# Patient Record
Sex: MALE | Race: WHITE | NOT HISPANIC OR LATINO | Employment: OTHER
[De-identification: names, ages, dates, MRNs, and addresses within clinical notes are randomized per-mention and may not be internally consistent; named-entity substitution may affect disease eponyms.]

---

## 2018-01-29 ENCOUNTER — RECORDS - HEALTHEAST (OUTPATIENT)
Dept: ADMINISTRATIVE | Facility: OTHER | Age: 58
End: 2018-01-29

## 2018-02-02 ENCOUNTER — RECORDS - HEALTHEAST (OUTPATIENT)
Dept: ADMINISTRATIVE | Facility: OTHER | Age: 58
End: 2018-02-02

## 2018-02-05 ENCOUNTER — AMBULATORY - HEALTHEAST (OUTPATIENT)
Dept: VASCULAR SURGERY | Facility: CLINIC | Age: 58
End: 2018-02-05

## 2018-02-05 DIAGNOSIS — L97.519 VARICOSE VEINS OF RIGHT LOWER EXTREMITY WITH ULCER OTHER PART OF FOOT (H): ICD-10-CM

## 2018-02-05 DIAGNOSIS — I83.015 VARICOSE VEINS OF RIGHT LOWER EXTREMITY WITH ULCER OTHER PART OF FOOT (H): ICD-10-CM

## 2018-02-13 ENCOUNTER — AMBULATORY - HEALTHEAST (OUTPATIENT)
Dept: VASCULAR SURGERY | Facility: CLINIC | Age: 58
End: 2018-02-13

## 2018-02-13 RX ORDER — IBUPROFEN 600 MG/1
600 TABLET, FILM COATED ORAL EVERY 6 HOURS PRN
Status: SHIPPED | COMMUNITY
Start: 2018-02-13

## 2018-02-13 RX ORDER — AMLODIPINE BESYLATE 5 MG/1
5 TABLET ORAL DAILY
Status: SHIPPED | COMMUNITY
Start: 2018-02-13 | End: 2023-12-05

## 2018-02-13 ASSESSMENT — MIFFLIN-ST. JEOR: SCORE: 1658.1

## 2018-02-14 ENCOUNTER — OFFICE VISIT - HEALTHEAST (OUTPATIENT)
Dept: VASCULAR SURGERY | Facility: CLINIC | Age: 58
End: 2018-02-14

## 2018-02-14 DIAGNOSIS — L97.319: ICD-10-CM

## 2018-02-14 DIAGNOSIS — L97.312 SKIN ULCER OF RIGHT ANKLE WITH FAT LAYER EXPOSED (H): ICD-10-CM

## 2018-02-14 DIAGNOSIS — I83.893 SYMPTOMATIC VARICOSE VEINS OF BOTH LOWER EXTREMITIES: ICD-10-CM

## 2018-02-14 RX ORDER — SILVER SULFADIAZINE 1 %
1 CREAM (GRAM) TOPICAL DAILY
Status: SHIPPED | COMMUNITY
Start: 2018-01-29 | End: 2023-12-05

## 2018-11-29 ENCOUNTER — COMMUNICATION - HEALTHEAST (OUTPATIENT)
Dept: VASCULAR SURGERY | Facility: CLINIC | Age: 58
End: 2018-11-29

## 2018-11-29 DIAGNOSIS — I83.893 VARICOSE VEINS OF LOWER EXTREMITIES WITH COMPLICATIONS, BILATERAL: ICD-10-CM

## 2021-06-01 VITALS — BODY MASS INDEX: 24.49 KG/M2 | HEIGHT: 72 IN | WEIGHT: 180.8 LBS

## 2021-06-01 VITALS — WEIGHT: 183.6 LBS

## 2021-06-16 NOTE — PROGRESS NOTES
[facility] Vein Consult      Assessment:     1. varicose veins, bilateral   2. venous stasis ulcer, right   3. Spider veins bilaterally    Plan:     1. Treatment options of conservative therapy of stockings use, exercise, weight loss,  elevating legs when possible.    2. Script for compression stockings 20-30 mm hg  3. Ultrasound to evaluate legs for incompetency of both deep and superficial system .   4. Surgical treatment   Debrided wounds today.dressing changes of silvadene guaze every day.    5. Follow up: with ultrasound and visit .   6. Call for any questions concerns or issues    Subjective:      Gurwinder Martin is a 57 y.o. male  who was referred by Michael Shen MD  for evaluation of varicose veins. Symptoms include pain, aching, fatigue, burning, edema, dermatitis, episodes of superficial thrombophlebitis and open weping ulcer for weeks.. Patient has history of leg selling, pain and vein issues that have progressed. Pain and symptoms have affected daily living and work activities needing medications. Here for evaluation today. no stocking or compression devic use    Allergies:Penicillins    Past Medical History:   Diagnosis Date     Hypertension      Varicose veins of right lower extremity        History reviewed. No pertinent surgical history.    Current Outpatient Prescriptions   Medication Sig Note     amLODIPine (NORVASC) 5 MG tablet Take 5 mg by mouth daily.      ibuprofen (ADVIL,MOTRIN) 600 MG tablet Take 600 mg by mouth every 6 (six) hours as needed for pain.      SSD 1 % cream Apply 1 application topically daily. 2/14/2018: Received from: External Pharmacy       History reviewed. No pertinent family history.     reports that he has been smoking.  He does not have any smokeless tobacco history on file.      Review of Systems  Pertinent items are noted in HPI.  A 12 point comprehensive review of systems was negative except as noted.      Objective:     Vitals:    02/14/18 1045   BP: 130/90    Patient Site: Left Arm   Patient Position: Sitting   Pulse: 100   Temp: 98.6  F (37  C)   TempSrc: Oral   Weight: 183 lb 9.6 oz (83.3 kg)     Body mass index is 24.9 kg/(m^2).    EXAM:  GENERAL: This is a well-developed 57 y.o. male who appears his stated age  HEAD: normocephalic  HEENT: Pupils equal and reactive bilaterally  MOUTH: mucus membranes intact. Normal dentation  CARDIAC: RRR without murmur  CHEST/LUNG:  Clear to auscultation bilaterally  ABDOMEN: Soft, nontender, nondistended, no masses noted   NEUROLOGIC: Focally intact, nonfocal, alert and oriented x 3  INTEGUMENT: No open lesions or ulcers  VASCULAR: Pulses intact, symmetrical upper and lower extremities. There areskin changes consistent with chronic venous insufficiency. Varicose veins present in bilateral greater saphenous distribution. Spider veins present bilateral. Ulcer 0.5 x 2 cm size with dead tissue in wound    Procedure:    Debridement:   After consent with the patient the area was prepped with lidocaine gel.  We then debrided this sharply after letting that sit for about 10-15 minutes.  We debrided the dead necrotic tissue there is material into and through the subcutaneous tissue.  All this nonviable tissue was removed.  Left with a good granulation base are healthy looking bleeding tissue.  The size of this wound was half a centimeter by 2 cm.  1 cm  area was debrided this was then dressed with a Silvadene and a foam overlay dressing.    William Gaviria MD  [facility] Surgery Dept.    1  .

## 2021-06-16 NOTE — PROGRESS NOTES
Pt was referred by his primary due to a wound on his right foot interior lateral, has had wound for 1 month.  Bulging veins bilateral, pt does not wear compression socks.  No history of varicose vein treatment or an ultrasound.

## 2021-06-26 NOTE — PROGRESS NOTES
Progress Notes by Tatyana Bo RN at 2/14/2018 10:40 AM     Author: Tatyana Bo RN Service: -- Author Type: Registered Nurse    Filed: 2/14/2018  3:51 PM Encounter Date: 2/14/2018 Status: Signed    : Tatyana Bo RN (Registered Nurse)           Left leg 2/4        Right leg 2/14        Right medial ankle wound 2/14

## 2023-06-10 ENCOUNTER — HOSPITAL ENCOUNTER (EMERGENCY)
Facility: CLINIC | Age: 63
Discharge: HOME | End: 2023-06-11
Attending: EMERGENCY MEDICINE | Admitting: EMERGENCY MEDICINE
Payer: COMMERCIAL

## 2023-06-10 ENCOUNTER — APPOINTMENT (OUTPATIENT)
Dept: CT IMAGING | Facility: CLINIC | Age: 63
End: 2023-06-10
Attending: EMERGENCY MEDICINE
Payer: COMMERCIAL

## 2023-06-10 DIAGNOSIS — K61.0 PERIANAL ABSCESS: ICD-10-CM

## 2023-06-10 LAB
ALBUMIN SERPL-MCNC: 4.4 G/DL (ref 3.5–5)
ALP SERPL-CCNC: 82 U/L (ref 45–120)
ALT SERPL W P-5'-P-CCNC: <9 U/L (ref 0–45)
ANION GAP SERPL CALCULATED.3IONS-SCNC: 10 MMOL/L (ref 5–18)
AST SERPL W P-5'-P-CCNC: 14 U/L (ref 0–40)
BASOPHILS # BLD AUTO: 0.1 10E3/UL (ref 0–0.2)
BASOPHILS NFR BLD AUTO: 1 %
BILIRUB DIRECT SERPL-MCNC: 0.2 MG/DL
BILIRUB SERPL-MCNC: 0.7 MG/DL (ref 0–1)
BUN SERPL-MCNC: 8 MG/DL (ref 8–22)
C REACTIVE PROTEIN LHE: 1.5 MG/DL (ref 0–?)
CALCIUM SERPL-MCNC: 9.5 MG/DL (ref 8.5–10.5)
CHLORIDE BLD-SCNC: 98 MMOL/L (ref 98–107)
CO2 SERPL-SCNC: 28 MMOL/L (ref 22–31)
CREAT SERPL-MCNC: 0.82 MG/DL (ref 0.7–1.3)
EOSINOPHIL # BLD AUTO: 0 10E3/UL (ref 0–0.7)
EOSINOPHIL NFR BLD AUTO: 0 %
ERYTHROCYTE [DISTWIDTH] IN BLOOD BY AUTOMATED COUNT: 14.7 % (ref 10–15)
GFR SERPL CREATININE-BSD FRML MDRD: >90 ML/MIN/1.73M2
GLUCOSE BLD-MCNC: 109 MG/DL (ref 70–125)
HCT VFR BLD AUTO: 47.7 % (ref 40–53)
HGB BLD-MCNC: 15.9 G/DL (ref 13.3–17.7)
IMM GRANULOCYTES # BLD: 0.1 10E3/UL
IMM GRANULOCYTES NFR BLD: 0 %
LYMPHOCYTES # BLD AUTO: 1.4 10E3/UL (ref 0.8–5.3)
LYMPHOCYTES NFR BLD AUTO: 11 %
MCH RBC QN AUTO: 28.8 PG (ref 26.5–33)
MCHC RBC AUTO-ENTMCNC: 33.3 G/DL (ref 31.5–36.5)
MCV RBC AUTO: 86 FL (ref 78–100)
MONOCYTES # BLD AUTO: 0.9 10E3/UL (ref 0–1.3)
MONOCYTES NFR BLD AUTO: 7 %
NEUTROPHILS # BLD AUTO: 10.4 10E3/UL (ref 1.6–8.3)
NEUTROPHILS NFR BLD AUTO: 81 %
NRBC # BLD AUTO: 0 10E3/UL
NRBC BLD AUTO-RTO: 0 /100
PLATELET # BLD AUTO: 304 10E3/UL (ref 150–450)
POTASSIUM BLD-SCNC: 4 MMOL/L (ref 3.5–5)
PROCALCITONIN SERPL-MCNC: 0.09 NG/ML (ref 0–0.49)
PROT SERPL-MCNC: 8 G/DL (ref 6–8)
RBC # BLD AUTO: 5.52 10E6/UL (ref 4.4–5.9)
SODIUM SERPL-SCNC: 136 MMOL/L (ref 136–145)
WBC # BLD AUTO: 12.8 10E3/UL (ref 4–11)

## 2023-06-10 PROCEDURE — 250N000011 HC RX IP 250 OP 636: Performed by: EMERGENCY MEDICINE

## 2023-06-10 PROCEDURE — 84145 PROCALCITONIN (PCT): CPT | Performed by: EMERGENCY MEDICINE

## 2023-06-10 PROCEDURE — 80053 COMPREHEN METABOLIC PANEL: CPT | Performed by: EMERGENCY MEDICINE

## 2023-06-10 PROCEDURE — 258N000003 HC RX IP 258 OP 636: Performed by: EMERGENCY MEDICINE

## 2023-06-10 PROCEDURE — 86140 C-REACTIVE PROTEIN: CPT | Performed by: EMERGENCY MEDICINE

## 2023-06-10 PROCEDURE — 250N000013 HC RX MED GY IP 250 OP 250 PS 637: Performed by: EMERGENCY MEDICINE

## 2023-06-10 PROCEDURE — 99285 EMERGENCY DEPT VISIT HI MDM: CPT | Mod: 25

## 2023-06-10 PROCEDURE — 82248 BILIRUBIN DIRECT: CPT | Performed by: EMERGENCY MEDICINE

## 2023-06-10 PROCEDURE — 85025 COMPLETE CBC W/AUTO DIFF WBC: CPT | Performed by: EMERGENCY MEDICINE

## 2023-06-10 PROCEDURE — 74177 CT ABD & PELVIS W/CONTRAST: CPT

## 2023-06-10 PROCEDURE — 36415 COLL VENOUS BLD VENIPUNCTURE: CPT | Performed by: EMERGENCY MEDICINE

## 2023-06-10 RX ORDER — METRONIDAZOLE 500 MG/1
500 TABLET ORAL ONCE
Status: COMPLETED | OUTPATIENT
Start: 2023-06-10 | End: 2023-06-10

## 2023-06-10 RX ORDER — KETOROLAC TROMETHAMINE 15 MG/ML
15 INJECTION, SOLUTION INTRAMUSCULAR; INTRAVENOUS ONCE
Status: COMPLETED | OUTPATIENT
Start: 2023-06-10 | End: 2023-06-10

## 2023-06-10 RX ORDER — CIPROFLOXACIN 500 MG/1
500 TABLET, FILM COATED ORAL ONCE
Status: COMPLETED | OUTPATIENT
Start: 2023-06-10 | End: 2023-06-10

## 2023-06-10 RX ORDER — METRONIDAZOLE 500 MG/1
500 TABLET ORAL 3 TIMES DAILY
Qty: 14 TABLET | Refills: 1 | Status: SHIPPED | OUTPATIENT
Start: 2023-06-10 | End: 2023-12-05

## 2023-06-10 RX ORDER — CIPROFLOXACIN 500 MG/1
500 TABLET, FILM COATED ORAL 2 TIMES DAILY
Qty: 14 TABLET | Refills: 0 | Status: SHIPPED | OUTPATIENT
Start: 2023-06-10 | End: 2023-12-05

## 2023-06-10 RX ORDER — IOPAMIDOL 755 MG/ML
90 INJECTION, SOLUTION INTRAVASCULAR ONCE
Status: COMPLETED | OUTPATIENT
Start: 2023-06-10 | End: 2023-06-10

## 2023-06-10 RX ADMIN — CIPROFLOXACIN HYDROCHLORIDE 500 MG: 500 TABLET, FILM COATED ORAL at 22:10

## 2023-06-10 RX ADMIN — METRONIDAZOLE 500 MG: 500 TABLET ORAL at 22:10

## 2023-06-10 RX ADMIN — SODIUM CHLORIDE, POTASSIUM CHLORIDE, SODIUM LACTATE AND CALCIUM CHLORIDE 1000 ML: 600; 310; 30; 20 INJECTION, SOLUTION INTRAVENOUS at 22:25

## 2023-06-10 RX ADMIN — IOPAMIDOL 90 ML: 755 INJECTION, SOLUTION INTRAVENOUS at 23:31

## 2023-06-10 ASSESSMENT — ACTIVITIES OF DAILY LIVING (ADL): ADLS_ACUITY_SCORE: 35

## 2023-06-11 VITALS
BODY MASS INDEX: 23.06 KG/M2 | WEIGHT: 170 LBS | OXYGEN SATURATION: 97 % | HEART RATE: 94 BPM | DIASTOLIC BLOOD PRESSURE: 100 MMHG | TEMPERATURE: 97.1 F | SYSTOLIC BLOOD PRESSURE: 164 MMHG | RESPIRATION RATE: 18 BRPM

## 2023-06-11 NOTE — ED TRIAGE NOTES
Pt presents to the ED with c/o of a blister on buttock. Pt reports that it has been present for a week, and burst earlier today. Pt reports bloody discharge. Denies any recent fevers.      Triage Assessment     Row Name 06/10/23 1954       Triage Assessment (Adult)    Airway WDL WDL       Respiratory WDL    Respiratory WDL WDL       Skin Circulation/Temperature WDL    Skin Circulation/Temperature WDL WDL       Cardiac WDL    Cardiac WDL WDL       Peripheral/Neurovascular WDL    Peripheral Neurovascular WDL WDL       Cognitive/Neuro/Behavioral WDL    Cognitive/Neuro/Behavioral WDL WDL

## 2023-06-11 NOTE — ED PROVIDER NOTES
EMERGENCY DEPARTMENT ENCOUNTER      NAME: Gurwinder Martin  AGE: 63 year old male  YOB: 1960  MRN: [id number]  EVALUATION DATE & TIME: No admission date for patient encounter.    PCP: Michael Shen (Inactive)    ED PROVIDER: Kamron Mabry M.D.      Chief Complaint   Patient presents with     Wound Check         IMPRESSION  1. Perianal abscess        PLAN  - follow up CT and reassess    ED COURSE & MEDICAL DECISION MAKING    ED Course as of 06/10/23 2259   Sat Brock 10, 2023   2112 63yoM with history of HTN, smoking presenting for evaluation of draining abscess near his anus over the past week. Reports it came to a head and started draining today. Denies any abdominal pain, fevers, sweats, chills, nausea, vomiting. States this happened one time in the past and resolved on its own.    BP 190s/110s on presentation with HR 100s and otherwise normal vitals. Calm & well-appearing on exam with clear lungs, normal work of breathing, no abdominal tenderness. Does have draining abscess (about 2cm x 1cm) just right of anal opening with no crepitus or streaking skin redness, no internal tenderness or obvious fluctuance.    Location concerning for possible perirectal abscess. May simply be perianal abscess. No concern for necrotizing fasciitis, Christelle's. Will obtain blood, CT while giving IVF, Toradol, antibiotics. Patient comfortable with this plan; no further questions at this time.   2258 Patient signed out to Dr. Hernandez at routine shift change. Plan at this time is follow up CT and reassess.       --------------------------------------------------------------------------------   --------------------------------------------------------------------------------     8:43 PM I met with the patient for the initial interview and physical examination. Discussed plan for treatment and workup in the ED.      This patient involved a high degree of complexity in medical decision making, as significant risks were  present and assessed. Recent encounters & results in medical record reviewed by me.    All workup (i.e. any EKG/labs/imaging as per charting below) reviewed and independently interpreted by me. See respective sections for details.    Broad differential considered for this patient presenting, including but not limited to:  Perianal abscess, perirectal abscess, necrotizing fasciitis, Christelle's, sepsis, Crohn's, EC fistula      See additional MDM below if interested.      MEDICATIONS GIVEN IN THE EMERGENCY DEPARTMENT  Medications   lactated ringers BOLUS 1,000 mL (1,000 mLs Intravenous $New Bag 6/10/23 2225)   ketorolac (TORADOL) injection 15 mg (15 mg Intravenous Not Given 6/10/23 2223)   ciprofloxacin (CIPRO) tablet 500 mg (500 mg Oral $Given 6/10/23 2210)   metroNIDAZOLE (FLAGYL) tablet 500 mg (500 mg Oral $Given 6/10/23 2210)               =================================================================      HPI  Patient information was obtained from: patient    Use of : N/A        Gurwinder Martin is a 63 year old male with no pertinent history who presents to this ED via walk in for evaluation of a wound check    Approximately one week ago the patient developed a blister on his right buttock. The patient notes that the pain from his blister has been progressively worsening over the past few days. Then today the blister bursted with bloody discharge shortly after. The patient currently endorses pain and increased swelling from the blister.     The patient reports that he has had something like this happen before in the past. However, he notes that in the past the pain has resolved on its own.     The patient denies any vomiting, fevers, abdominal pain, or any other complaints.     --------------- MEDICAL HISTORY ---------------  PAST MEDICAL HISTORY:  Reviewed independently by me.  No past medical history on file.  There is no problem list on file for this patient.      PAST SURGICAL HISTORY:  Reviewed  independently by me.  No past surgical history on file.    CURRENT MEDICATIONS:    Reviewed independently by me.  No current facility-administered medications for this encounter.    Current Outpatient Medications:      ciprofloxacin (CIPRO) 500 MG tablet, Take 1 tablet (500 mg) by mouth 2 times daily, Disp: 14 tablet, Rfl: 0     metroNIDAZOLE (FLAGYL) 500 MG tablet, Take 1 tablet (500 mg) by mouth 3 times daily, Disp: 14 tablet, Rfl: 1     amLODIPine (NORVASC) 5 MG tablet, [AMLODIPINE (NORVASC) 5 MG TABLET] Take 5 mg by mouth daily., Disp: , Rfl:      ibuprofen (ADVIL,MOTRIN) 600 MG tablet, [IBUPROFEN (ADVIL,MOTRIN) 600 MG TABLET] Take 600 mg by mouth every 6 (six) hours as needed for pain., Disp: , Rfl:      SSD 1 % cream, [SSD 1 % CREAM] Apply 1 application topically daily., Disp: , Rfl:     ALLERGIES:  Reviewed independently by me.  Allergies   Allergen Reactions     Penicillins Unknown       FAMILY HISTORY:  Reviewed independently by me.  Reviewed. No pertinent past family history.    SOCIAL HISTORY:   Reviewed independently by me.  Social History     Socioeconomic History     Marital status: Single   Tobacco Use     Smoking status: Every Day       --------------- PHYSICAL EXAM ---------------  Nursing notes and vitals independently reviewed by me.  VITALS:  Vitals:    06/10/23 1952 06/10/23 2230   BP: (!) 192/112 (!) 187/109   Pulse: 103 83   Resp: 18    Temp: 97.1  F (36.2  C)    TempSrc: Temporal    SpO2: 100% 100%   Weight: 77.1 kg (170 lb)        PHYSICAL EXAM:    General:  alert, interactive, no distress  Eyes:  conjunctivae clear, conjugate gaze  HENT:  atraumatic, nose with no rhinorrhea, oropharynx clear  Neck:  no meningismus  Cardiovascular:  HR 80's during exam, regular rhythm, no murmurs, brisk cap refill  Chest:  no chest wall tenderness  Pulmonary:  no stridor, normal phonation, normal work of breathing, clear lungs bilaterally  Abdomen:  soft, nondistended, nontender  Rectal: approximately 2cm x  1cm draining abscess just right of anal opening with mild tenderness, no crepitus, no streaking skin redness  :  no CVA tenderness  Back:  no midline spinal tenderness  Musculoskeletal:  no pretibial edema, no calf tenderness. Gross ROM intact to joints of extremities with no obvious deformities.  Skin:  warm, dry, no rash  Neuro:  awake, alert, answers questions appropriately, follows commands, moves all limbs  Psych:  calm, normal affect      --------------- RESULTS ---------------  LAB:  Reviewed and independently interpreted by me.  Results for orders placed or performed during the hospital encounter of 06/10/23   Basic metabolic panel   Result Value Ref Range    Sodium 136 136 - 145 mmol/L    Potassium 4.0 3.5 - 5.0 mmol/L    Chloride 98 98 - 107 mmol/L    Carbon Dioxide (CO2) 28 22 - 31 mmol/L    Anion Gap 10 5 - 18 mmol/L    Urea Nitrogen 8 8 - 22 mg/dL    Creatinine 0.82 0.70 - 1.30 mg/dL    Calcium 9.5 8.5 - 10.5 mg/dL    Glucose 109 70 - 125 mg/dL    GFR Estimate >90 >60 mL/min/1.73m2   Hepatic function panel   Result Value Ref Range    Bilirubin Total 0.7 0.0 - 1.0 mg/dL    Bilirubin Direct 0.2 <=0.5 mg/dL    Protein Total 8.0 6.0 - 8.0 g/dL    Albumin 4.4 3.5 - 5.0 g/dL    Alkaline Phosphatase 82 45 - 120 U/L    AST 14 0 - 40 U/L    ALT <9 0 - 45 U/L   CRP inflammation   Result Value Ref Range    CRP 1.5 (H) 0.0 - <0.8 mg/dL   CBC with platelets and differential   Result Value Ref Range    WBC Count 12.8 (H) 4.0 - 11.0 10e3/uL    RBC Count 5.52 4.40 - 5.90 10e6/uL    Hemoglobin 15.9 13.3 - 17.7 g/dL    Hematocrit 47.7 40.0 - 53.0 %    MCV 86 78 - 100 fL    MCH 28.8 26.5 - 33.0 pg    MCHC 33.3 31.5 - 36.5 g/dL    RDW 14.7 10.0 - 15.0 %    Platelet Count 304 150 - 450 10e3/uL    % Neutrophils 81 %    % Lymphocytes 11 %    % Monocytes 7 %    % Eosinophils 0 %    % Basophils 1 %    % Immature Granulocytes 0 %    NRBCs per 100 WBC 0 <1 /100    Absolute Neutrophils 10.4 (H) 1.6 - 8.3 10e3/uL    Absolute  Lymphocytes 1.4 0.8 - 5.3 10e3/uL    Absolute Monocytes 0.9 0.0 - 1.3 10e3/uL    Absolute Eosinophils 0.0 0.0 - 0.7 10e3/uL    Absolute Basophils 0.1 0.0 - 0.2 10e3/uL    Absolute Immature Granulocytes 0.1 <=0.4 10e3/uL    Absolute NRBCs 0.0 10e3/uL         RADIOLOGY:  CT abdomen/pelvis:  pending at time of patient care handoff            --------------- ADDITIONAL MDM ---------------  History:  - Supplemental history from:       -- see above charting, if applicable: patient  - External Record(s) reviewed:       -- see above charting, if applicable       -- Inpatient/outpatient record (clinic visit 2/14/18), prior labs, prior imaging    Workup:  - Chart documentation above includes differential considered and any EKGs or imaging independently interpreted by provider.  - In additional to work up documented, I considered the following work up:       -- see above charting, if applicable    External Consultation:  - Discussion of management with another provider:       -- see above charting, if applicable    Complicating Factors:  - Care impacted by chronic illness:       -- see above MDM, past medical history, & problem list  - Care affected by social determinants of health:       -- see above social history    Disposition Considerations:  - Admission considered. Patient was signed out to the oncoming physician, disposition pending.         I, Dyan Cedeño, am serving as a scribe to document services personally performed by Dr. Kamron Mabry based on my observation and the provider's statements to me. I, Kamron Mabry MD attest that Dyan Cedeño is acting in a scribe capacity, has observed my performance of the services and has documented them in accordance with my direction.      Kamron Mabry MD  06/10/23  Emergency Medicine  [facility] EMERGENCY ROOM  [address] [address] [phone]  Dept: [phone]       Clotilde  Kamron LORENZO MD  06/10/23 2422

## 2023-06-11 NOTE — ED NOTES
EMERGENCY DEPARTMENT SIGN OUT NOTE        ED COURSE AND MEDICAL DECISION MAKING  Patient was signed out to me by Dr Familia Mabry at 10:58 PM  11:51 PM I met, rechecked, and updated patient on results. We discussed the plan for discharge and the patient is agreeable. Reviewed supportive cares, symptomatic treatment, outpatient follow up, and reasons to return to the Emergency Department. Patient to be discharged by ED RN.     In brief, Gurwinder Martin is a 63 year old male who initially presented for wound check. Approximately one week ago the patient developed a blister on his right buttock. The patient notes that the pain from his blister has been progressively worsening over the past few days. Then today the blister bursted with bloody discharge shortly after. The patient currently endorses pain and increased swelling from the blister.     At time of sign out, disposition was pending CT Abdomen Pelvis.    CT scan shows a perianal abscess.  No internal spread.  This is already draining.  We will have him continue antibiotics at home.  Follow-up with primary.  Return for worsening symptoms.  We will get his blood pressure rechecked next week.     FINAL IMPRESSION    1. Perianal abscess        ED MEDS  Medications   lactated ringers BOLUS 1,000 mL (0 mLs Intravenous Stopped 6/10/23 2328)   ketorolac (TORADOL) injection 15 mg (15 mg Intravenous Not Given 6/10/23 2223)   ciprofloxacin (CIPRO) tablet 500 mg (500 mg Oral $Given 6/10/23 2210)   metroNIDAZOLE (FLAGYL) tablet 500 mg (500 mg Oral $Given 6/10/23 2210)   iopamidol (ISOVUE-370) solution 90 mL (90 mLs Intravenous $Given 6/10/23 2331)       LAB  Labs Ordered and Resulted from Time of ED Arrival to Time of ED Departure   CRP INFLAMMATION - Abnormal       Result Value    CRP 1.5 (*)    CBC WITH PLATELETS AND DIFFERENTIAL - Abnormal    WBC Count 12.8 (*)     RBC Count 5.52      Hemoglobin 15.9      Hematocrit 47.7      MCV 86      MCH 28.8      MCHC 33.3      RDW  14.7      Platelet Count 304      % Neutrophils 81      % Lymphocytes 11      % Monocytes 7      % Eosinophils 0      % Basophils 1      % Immature Granulocytes 0      NRBCs per 100 WBC 0      Absolute Neutrophils 10.4 (*)     Absolute Lymphocytes 1.4      Absolute Monocytes 0.9      Absolute Eosinophils 0.0      Absolute Basophils 0.1      Absolute Immature Granulocytes 0.1      Absolute NRBCs 0.0     BASIC METABOLIC PANEL - Normal    Sodium 136      Potassium 4.0      Chloride 98      Carbon Dioxide (CO2) 28      Anion Gap 10      Urea Nitrogen 8      Creatinine 0.82      Calcium 9.5      Glucose 109      GFR Estimate >90     HEPATIC FUNCTION PANEL - Normal    Bilirubin Total 0.7      Bilirubin Direct 0.2      Protein Total 8.0      Albumin 4.4      Alkaline Phosphatase 82      AST 14      ALT <9     PROCALCITONIN - Normal    Procalcitonin 0.09           RADIOLOGY    CT Abdomen Pelvis w Contrast   Final Result   IMPRESSION:    1.  Small right perianal abscess measuring up to 2.2 cm. No intrapelvic extension.   2.  Bilateral indeterminate adrenal gland nodules.   3.  Colonic diverticula without acute diverticulitis.      REFERENCE:   Management of Incidental Adrenal Masses: A White Paper of the ACR Incidental Findings Committee. J Am Moy Radiol 2017;14:9032-7328.      ? 1 cm and < 4 cm:       No prior imaging and no history of cancer:   1-2 cm: probably benign. Consider 12-month CT adrenal follow-up.   >2cm, <4 cm: Adrenal CT.      No prior imaging and history of cancer: Adrenal CT      Prior Imaging:   Stable for 1 year: Benign. No follow-up.   New or enlarging:   --Adrenal CT or resection if no history of cancer.   --PET/CT or biopsy if positive history of cancer.                            DISCHARGE MEDS  New Prescriptions    CIPROFLOXACIN (CIPRO) 500 MG TABLET    Take 1 tablet (500 mg) by mouth 2 times daily    METRONIDAZOLE (FLAGYL) 500 MG TABLET    Take 1 tablet (500 mg) by mouth 3 times daily       I,  Ciara Billingsley, am serving as a scribe to document services personally performed by Dr. Hernandez, based on my observations and the provider's statements to me. I, Dr. Hernandez, attest that Ciara Billingsley is acting in a scribe capacity, has observed my performance of the services and has documented them in accordance with my direction.    Oscar Hernandez MD  [facility] EMERGENCY ROOM  [address] [facility] [address]  [phone]     sOcar Hernandez MD  06/10/23 9526

## 2023-06-11 NOTE — DISCHARGE INSTRUCTIONS
Take the antibiotics (ciprofloxacin & Flagyl) for your infection.    As needed for pain control at home, take:  - over-the-counter ibuprofen 800mg by mouth every 8 hours (max dose 2400mg in 24 hours)  - over-the-counter acetaminophen 1g by mouth every 6 hours (max dose 4g in 24 hours)    Follow up with your Primary Care provider in 2 days for a recheck.    Return to the Emergency Department for any persistent vomiting, high fevers, severe worsening, or any other concerns.

## 2023-06-12 ENCOUNTER — PATIENT OUTREACH (OUTPATIENT)
Dept: SURGERY | Facility: CLINIC | Age: 63
End: 2023-06-12
Payer: COMMERCIAL

## 2023-06-12 NOTE — PROGRESS NOTES
Perianal abscess triage note:     Pt was seen in the ED on 6/10 for a perianal abscess seen on CT. Was prescribed cipro/flagyl.     LVM x2 with my direct call back number. Pt does not have MyChart.

## 2023-06-13 ENCOUNTER — TELEPHONE (OUTPATIENT)
Dept: SURGERY | Facility: CLINIC | Age: 63
End: 2023-06-13
Payer: COMMERCIAL

## 2023-06-13 NOTE — TELEPHONE ENCOUNTER
[facility] Call Center    Phone Message    May a detailed message be left on voicemail: yes     Reason for Call: Other:     Pt returned Lesa's call and is requesting a call back. Pt stated they tried to call numbers for both Lesa and Anna and was unable to get a hold of them.    Action Taken: Message routed to:  [facility] SANDY CASTLE    Travel Screening: Not Applicable

## 2023-06-14 ENCOUNTER — TELEPHONE (OUTPATIENT)
Dept: SURGERY | Facility: CLINIC | Age: 63
End: 2023-06-14
Payer: COMMERCIAL

## 2023-06-14 ENCOUNTER — PATIENT OUTREACH (OUTPATIENT)
Dept: CARE COORDINATION | Facility: CLINIC | Age: 63
End: 2023-06-14
Payer: COMMERCIAL

## 2023-06-14 NOTE — CONFIDENTIAL NOTE
Patient was in ED on 6/10 for a perianal abscess. He states this now has resolved, completely drained, and he is following up with his PCP tomorrow. He has been doing sitz baths. No need for him to follow up with [facility].

## 2023-06-14 NOTE — PROGRESS NOTES
Clinic Care Coordination Contact    Follow Up Progress Note      Assessment: The pt was recently in the ED, I called to check up on the pt and help the pt setup a ED follow up. The pt was at [facility] for a wound check. I called and talked to the pt, it seems like the pt has not been seen here before. I asked to see if the pt will be coming here. Pt stated that he has a clinic, but is unable to get a hold of them, so he has made a follow up with [facility] for tomorrow at 3:20pm with .    Care Gaps:    Health Maintenance Due   Topic Date Due     YEARLY PREVENTIVE VISIT  Never done     ADVANCE CARE PLANNING  Never done     COVID-19 Vaccine (1) Never done     Pneumococcal Vaccine: Pediatrics (0 to 5 Years) and At-Risk Patients (6 to 64 Years) (1 - PCV) Never done     COLORECTAL CANCER SCREENING  Never done     HIV SCREENING  Never done     HEPATITIS C SCREENING  Never done     DTAP/TDAP/TD IMMUNIZATION (1 - Tdap) Never done     LIPID  Never done     ZOSTER IMMUNIZATION (1 of 2) Never done     LUNG CANCER SCREENING  Never done     PHQ-2 (once per calendar year)  Never done           Care Plans      Intervention/Education provided during outreach:               Plan:     Care Coordinator will follow up in

## 2023-06-14 NOTE — TELEPHONE ENCOUNTER
Returned patient call and LVM x1 with my direct number. Anna LEONE also called pt and LVM x1.     Gurwinder has received four total voicemails.

## 2023-06-16 ENCOUNTER — OFFICE VISIT (OUTPATIENT)
Dept: FAMILY MEDICINE | Facility: CLINIC | Age: 63
End: 2023-06-16
Payer: COMMERCIAL

## 2023-06-16 VITALS
TEMPERATURE: 97.6 F | HEIGHT: 74 IN | DIASTOLIC BLOOD PRESSURE: 94 MMHG | SYSTOLIC BLOOD PRESSURE: 179 MMHG | OXYGEN SATURATION: 98 % | HEART RATE: 82 BPM | WEIGHT: 153 LBS | BODY MASS INDEX: 19.64 KG/M2

## 2023-06-16 DIAGNOSIS — I10 HYPERTENSION, UNSPECIFIED TYPE: ICD-10-CM

## 2023-06-16 DIAGNOSIS — K61.0 PERIANAL ABSCESS: Primary | ICD-10-CM

## 2023-06-16 PROCEDURE — 99203 OFFICE O/P NEW LOW 30 MIN: CPT | Mod: GC

## 2023-06-16 NOTE — PROGRESS NOTES
Preceptor Attestation:   Patient seen, evaluated and discussed with the resident Dr. Barrow. I have verified the content of the note, which accurately reflects my assessment of the patient and the plan of care.    Recommend follow-up to review HTN, possible relation to significant renal cysts.    Supervising Physician:Benjamin Rosenstein, MD, MA [facility]

## 2023-06-16 NOTE — PROGRESS NOTES
Assessment & Plan     (K61.0) Perianal abscess  (primary encounter diagnosis)  Comment: reports feeling better and finished antibiotic course. No fever, no drainage.  Plan: pelvic CT 6/10 no intrapelvic extension, no need for follow up. Return precautions given to patient.    (I10) Hypertension, unspecified type  Comment: history of hypertension, currently on amlodipine 5 mg. BP 170s in the clinic. Not measuring BP at home. Discussed with patient need for close follow up and labs including bmp. Will also like to increase dose of CCB and/oradd another agent for better BP control. Patient states he wants to follow up with his primary care clinic and will call if he doesn't find appointment there. Also discussed incidntal CT finding 6/10 including bilateral renal cysts and adrenal gland nodules.   Plan: close follow up for BP control.              ABIEL Christopher  [facility] PGY1  [facility]    Campbell Purdy is a 63 year old, presenting for the following health issues:  Hospital F/U (Following up from ER infection on butt from last Saturday. )        6/16/2023     3:53 PM   Additional Questions   Roomed by Jj   Accompanied by Self     HPI   Gurwinder 63 year old male, hx of HTN. He is here for ED follow up.  Was recently in the ED 6/10 for abscess and pain near the anus.  No fever/chills at the time of ED visit or since.   He had a pelvic CT showed small right perianal abscess and no intrapelvic extension.   Today he reports feeling better, no pain, no fever. Finished a course of ciprofloxacin and flagyl.  Pelvic CT 6/10  with incidental findings including bilateral indeterminate adrenal gland nodules and bilateral renal cysts.   Discussed with patient the need for follow up and possibly repeat imaging.    Elevated BP in clinic  179/94.   Patient reports compliance with his medications  Doesn't check BP at home.   Denies chest pain, headache, blurry vision.  "  Wants to go back and have follow up with his PCP in another clinic.  States he was diagnosed prior to covid pandemic and was undoctored since.   Discussed that he will need lab test and changes in his antihypertensives dose or addition of another agent. Currently on 5 mg amlodipine.       Review of Systems   Constitutional, HEENT, cardiovascular, pulmonary, gi and gu systems are negative, except as otherwise noted.      Objective    BP (!) 179/94   Pulse 82   Temp 97.6  F (36.4  C) (Tympanic)   Ht 1.88 m (6' 2\")   Wt 69.4 kg (153 lb)   SpO2 98%   BMI 19.64 kg/m    Body mass index is 19.64 kg/m .  Physical Exam   GENERAL: healthy, alert and no distress  RESP: lungs clear to auscultation - no rales, rhonchi or wheezes  CV: regular rate and rhythm, normal S1 S2,no murmur, no peripheral edema and peripheral.  ABDOMEN: soft, nontender,  no masses.  MS: no gross musculoskeletal defects noted, no edema  : normal appearance of the anus, no draining abscess and no surrounding erythema.               "

## 2023-09-15 ENCOUNTER — OFFICE VISIT (OUTPATIENT)
Dept: FAMILY MEDICINE | Facility: CLINIC | Age: 63
End: 2023-09-15
Payer: COMMERCIAL

## 2023-09-15 VITALS
OXYGEN SATURATION: 99 % | RESPIRATION RATE: 118 BRPM | DIASTOLIC BLOOD PRESSURE: 116 MMHG | TEMPERATURE: 97.8 F | HEART RATE: 100 BPM | SYSTOLIC BLOOD PRESSURE: 175 MMHG

## 2023-09-15 DIAGNOSIS — I10 HYPERTENSION, UNSPECIFIED TYPE: ICD-10-CM

## 2023-09-15 DIAGNOSIS — K61.0 PERIANAL ABSCESS: Primary | ICD-10-CM

## 2023-09-15 PROCEDURE — 99214 OFFICE O/P EST MOD 30 MIN: CPT | Performed by: PHYSICIAN ASSISTANT

## 2023-09-15 RX ORDER — CLINDAMYCIN HCL 300 MG
300 CAPSULE ORAL 4 TIMES DAILY
Qty: 40 CAPSULE | Refills: 0 | Status: SHIPPED | OUTPATIENT
Start: 2023-09-15 | End: 2023-09-25

## 2023-09-15 RX ORDER — AMLODIPINE BESYLATE 5 MG/1
5 TABLET ORAL DAILY
Qty: 30 TABLET | Refills: 1 | Status: SHIPPED | OUTPATIENT
Start: 2023-09-15 | End: 2023-11-03

## 2023-09-15 NOTE — PROGRESS NOTES
Patient presents with:  Abscess: Abscess on right side butt    (K61.0) Perianal abscess  (primary encounter diagnosis)  Comment:   Plan: clindamycin (CLEOCIN) 300 MG capsule, Adult         Colorectal Surgery  Referral            Soak in tub for 30-60 minutes when you get home today.  The cyst should drain fairly quickly within the next day or so.  DO NOT POKE IT TO DRAIN IT. Let is drain on its own.     Follow up with [facility].     (I10) Hypertension, unspecified type  Comment: previous BMP was wnl June 2023  Plan: amLODIPine (NORVASC) 5 MG tablet    Take daily.  See handout on blood pressure.  Follow up with Primary clinic for re-check within 1 month.          A good portion of the visit today was discussing the importance of follow-up for his hypertension that is uncontrolled.  Also for follow-up with colorectal, regardless of whether or not this clears on its own, as it is likely to recur considering where it is located and the fact that he recently had an abscess in the same area within the past 3 months.      35 minutes spent by me on the date of the encounter doing chart review, patient visit, and documentation       SUBJECTIVE:   Gurwinder Martin is a 63 year old male who presents today with an abscess just to the right of his anus.  Has had this in the past, and it ruptured on its own.  He never followed up with colorectal as it resolved.  He denies any fevers.    His blood pressure in clinic is 175/116.  He denies any headaches or lower extremity swelling.  He has in the past taken amlodipine 5 mg daily, but when the clinic would not refill it for him, he stopped taking it.  He has not followed up with the primary clinic since he stopped taking it.          Current Outpatient Medications   Medication Sig Dispense Refill    Multiple Vitamins-Iron (DAILY-MYKEL/IRON/BETA-CAROTENE) TABS TAKE 1 TABLET BY MOUTH DAILY. (Patient not taking: Reported on 10/19/2020) 30 tablet 7     Social History      Tobacco Use    Smoking status: Never Smoker    Smokeless tobacco: Never Used   Substance Use Topics    Alcohol use: Not on file     Family History   Problem Relation Age of Onset    Diabetes Mother     Diabetes Father          ROS:    10 point ROS of systems including Constitutional, Eyes, Respiratory, Cardiovascular, Gastroenterology, Genitourinary, Integumentary, Muscularskeletal, Psychiatric ,neurological were all negative except for pertinent positives noted in my HPI       OBJECTIVE:  BP (!) 175/116   Pulse 100   Temp 97.8  F (36.6  C) (Oral)   Resp (!) 118   SpO2 99%   Physical Exam:  GENERAL APPEARANCE: healthy, alert and no distress  EYES: EOMI,  PERRL, conjunctiva clear  RESP: lungs clear to auscultation - no rales, rhonchi or wheezes  CV: regular rates and rhythm, normal S1 S2, no murmur noted  Rectal/SKIN: Abscess to right of anus, which is fluctuant.  The skin is quite thin and purulent material is seen through it.  No vesicles.

## 2023-09-15 NOTE — PATIENT INSTRUCTIONS
(K61.0) Perianal abscess  (primary encounter diagnosis)  Comment:   Plan: clindamycin (CLEOCIN) 300 MG capsule, Adult         Colorectal Surgery  Referral            Soak in tub for 30-60 minutes when you get home today.  The cyst should drain fairly quickly within the next day or so.  DO NOT POKE IT TO DRAIN IT. Let is drain on its own.     Follow up with [facility] REG[facility]DL[facility].     (I10) Hypertension, unspecified type  Comment:   Plan: amLODIPine (NORVASC) 5 MG tablet    Take daily.  See handout on blood pressure.  Follow up with Primary clinic for re-check within 1 month.

## 2023-09-18 ENCOUNTER — TELEPHONE (OUTPATIENT)
Dept: SURGERY | Facility: CLINIC | Age: 63
End: 2023-09-18
Payer: COMMERCIAL

## 2023-09-18 NOTE — TELEPHONE ENCOUNTER
Patient was seen by internal medicine on 9/15 for recurrent perianal abscess and prescribed clindamycin. I offered him appt this Friday but he states this will not work for him. He said he would call me back to schedule. The abscess has started to drain on its own.    He kept asking me why he was getting recurrent abscess. I told him we need to exam him first to be able to determine this. He asked this quite a few times.

## 2023-09-18 NOTE — TELEPHONE ENCOUNTER
[facility]    Phone Message    May a detailed message be left on voicemail: yes     Reason for Call: Appointment Intake    Referring Provider Name: Shannan Martin PA-C i  Diagnosis and/or Symptoms: Perianal abscess [K61.0]    Sending encounter per guidelines for the DX.  Please review. Thank you    Action Taken: Message routed to:  [facility] ([facility] Colorectal Surg    Travel Screening: Not Applicable

## 2023-10-14 ENCOUNTER — HEALTH MAINTENANCE LETTER (OUTPATIENT)
Age: 63
End: 2023-10-14

## 2023-10-24 ENCOUNTER — OFFICE VISIT (OUTPATIENT)
Dept: FAMILY MEDICINE | Facility: CLINIC | Age: 63
End: 2023-10-24
Payer: COMMERCIAL

## 2023-10-24 VITALS
RESPIRATION RATE: 18 BRPM | BODY MASS INDEX: 19.58 KG/M2 | OXYGEN SATURATION: 98 % | HEART RATE: 92 BPM | TEMPERATURE: 98 F | HEIGHT: 74 IN | SYSTOLIC BLOOD PRESSURE: 162 MMHG | DIASTOLIC BLOOD PRESSURE: 94 MMHG | WEIGHT: 152.6 LBS

## 2023-10-24 DIAGNOSIS — E27.8 ADRENAL INCIDENTALOMA (H): ICD-10-CM

## 2023-10-24 DIAGNOSIS — K61.0 PERIANAL ABSCESS: Primary | ICD-10-CM

## 2023-10-24 DIAGNOSIS — Z87.891 PERSONAL HISTORY OF TOBACCO USE, PRESENTING HAZARDS TO HEALTH: ICD-10-CM

## 2023-10-24 DIAGNOSIS — I10 ESSENTIAL HYPERTENSION: ICD-10-CM

## 2023-10-24 LAB
ALBUMIN SERPL BCG-MCNC: 4.5 G/DL (ref 3.5–5.2)
ALP SERPL-CCNC: 70 U/L (ref 40–129)
ALT SERPL W P-5'-P-CCNC: 18 U/L (ref 0–70)
ANION GAP SERPL CALCULATED.3IONS-SCNC: 10 MMOL/L (ref 7–15)
AST SERPL W P-5'-P-CCNC: 23 U/L (ref 0–45)
BILIRUB SERPL-MCNC: 0.6 MG/DL
BUN SERPL-MCNC: 13.3 MG/DL (ref 8–23)
CALCIUM SERPL-MCNC: 9.6 MG/DL (ref 8.8–10.2)
CHLORIDE SERPL-SCNC: 102 MMOL/L (ref 98–107)
CHOLEST SERPL-MCNC: 186 MG/DL
CREAT SERPL-MCNC: 0.72 MG/DL (ref 0.67–1.17)
DEPRECATED HCO3 PLAS-SCNC: 28 MMOL/L (ref 22–29)
EGFRCR SERPLBLD CKD-EPI 2021: >90 ML/MIN/1.73M2
GLUCOSE SERPL-MCNC: 97 MG/DL (ref 70–99)
HBA1C MFR BLD: 5.5 % (ref 0–5.6)
HDLC SERPL-MCNC: 61 MG/DL
LDLC SERPL CALC-MCNC: 105 MG/DL
NONHDLC SERPL-MCNC: 125 MG/DL
POTASSIUM SERPL-SCNC: 4.2 MMOL/L (ref 3.4–5.3)
PROT SERPL-MCNC: 7.6 G/DL (ref 6.4–8.3)
SODIUM SERPL-SCNC: 140 MMOL/L (ref 135–145)
TRIGL SERPL-MCNC: 101 MG/DL

## 2023-10-24 PROCEDURE — 83835 ASSAY OF METANEPHRINES: CPT | Mod: 90 | Performed by: STUDENT IN AN ORGANIZED HEALTH CARE EDUCATION/TRAINING PROGRAM

## 2023-10-24 PROCEDURE — 83036 HEMOGLOBIN GLYCOSYLATED A1C: CPT | Performed by: STUDENT IN AN ORGANIZED HEALTH CARE EDUCATION/TRAINING PROGRAM

## 2023-10-24 PROCEDURE — 99000 SPECIMEN HANDLING OFFICE-LAB: CPT | Performed by: STUDENT IN AN ORGANIZED HEALTH CARE EDUCATION/TRAINING PROGRAM

## 2023-10-24 PROCEDURE — 80061 LIPID PANEL: CPT | Performed by: STUDENT IN AN ORGANIZED HEALTH CARE EDUCATION/TRAINING PROGRAM

## 2023-10-24 PROCEDURE — 80053 COMPREHEN METABOLIC PANEL: CPT | Performed by: STUDENT IN AN ORGANIZED HEALTH CARE EDUCATION/TRAINING PROGRAM

## 2023-10-24 PROCEDURE — 84244 ASSAY OF RENIN: CPT | Mod: 90 | Performed by: STUDENT IN AN ORGANIZED HEALTH CARE EDUCATION/TRAINING PROGRAM

## 2023-10-24 PROCEDURE — 82088 ASSAY OF ALDOSTERONE: CPT | Performed by: STUDENT IN AN ORGANIZED HEALTH CARE EDUCATION/TRAINING PROGRAM

## 2023-10-24 PROCEDURE — 36415 COLL VENOUS BLD VENIPUNCTURE: CPT | Performed by: STUDENT IN AN ORGANIZED HEALTH CARE EDUCATION/TRAINING PROGRAM

## 2023-10-24 PROCEDURE — 99214 OFFICE O/P EST MOD 30 MIN: CPT | Performed by: STUDENT IN AN ORGANIZED HEALTH CARE EDUCATION/TRAINING PROGRAM

## 2023-10-24 NOTE — PROGRESS NOTES
Assessment & Plan     Perianal abscess  Gurwinder is a 63-year-old male who has had 2 episodes of perianal abscess occurring in both June and September 2023, which cleared with antibiotics most recently (clindamycin for 10 days) and on examination today does not have any abscess.  Given the recurrence over a short period of time, it is likely that it was incompletely treated in June.  Believe that he does not require follow-up however he may consider calling colorectal surgery and have an evaluation to determine cause of recurrent abscess.    Essential hypertension  Has hypertension, which appears to be much worse in the office than at home per his blood pressure log.  He has been taking amlodipine 5 mg, with blood pressures averaging 120s over 70s to low 80s at home.  Today in the office systolic of 162 diastolic of 94 would indicate that he is not well controlled.  It is possible either that patient has significant anxiety when in the office and this is raising his blood pressure only in this location, or his blood pressure monitor at home is inaccurate, I recommend that he return again in 2 to 4 weeks with his blood pressure cuff and we will test for accuracy.  Until that time we will continue with amlodipine 5 mg.  We will assess for comorbidities with lipid panel and CMP.  Screen for diabetes.  - Lipid panel reflex to direct LDL Non-fasting  - Comprehensive metabolic panel (BMP + Alb, Alk Phos, ALT, AST, Total. Bili, TP)  - Hemoglobin A1c  - Lipid panel reflex to direct LDL Non-fasting  - Comprehensive metabolic panel (BMP + Alb, Alk Phos, ALT, AST, Total. Bili, TP)  - Hemoglobin A1c    Adrenal incidentaloma (H24)  Patient had bilateral adrenal incidentaloma's seen on imaging in June 2023, measuring 3.0 x 2.3 cm on the left, and 3.2 x 2.0 cm on the right.  Given patient has hypertension do recommend that he has aldosterone and renin activity measured to evaluate for primary aldosteronism.  Given low blood  pressures at home and high blood pressures here could also consider possible pheochromocytoma, will obtain metanephrine lab work-up.  We will repeat CT abdomen to better assess the attenuation of these adrenal nodules.  - Aldosterone  - Renin activity  - Aldosterone Renin Ratio  - CT Abdomen w/o & w Contrast  - Metanephrines Plasma Free  - Aldosterone Renin Ratio  - Metanephrines Plasma Free      Tobacco use    Patient reports willingness to quit, due to patient having many questions, as well as reviewing adrenal incidentaloma on imaging and plan for it, we were unable to further discuss quitting tobacco at this time.  He will return to care in 2 to 4 weeks to further discuss stopping tobacco.                 Karson Moura MD  [facility]    Campbell Purdy is a 63 year old, presenting for the following health issues:  Follow Up (Urgent care follow up. High blood pressure concerns. Infection from urgent care visit.)        10/24/2023     2:20 PM   Additional Questions   Roomed by Lacy RUIZ       History of Present Illness       Hypertension: He presents for follow up of hypertension.  He does check blood pressure  regularly outside of the clinic. Outside blood pressures have been over 140/90. He does not follow a low salt diet.     He eats 2-3 servings of fruits and vegetables daily.He consumes 0 sweetened beverage(s) daily.He exercises with enough effort to increase his heart rate 10 to 19 minutes per day.  He exercises with enough effort to increase his heart rate 3 or less days per week.   He is taking medications regularly.       Patient reports that the infection around his rectum had improved after taking medication, and no longer notices anything in that area.  He reports he has had this happen to him twice where he got an infection around the rectal area.  He was given clindamycin 300 mg 4 times daily for 10 days as last urgent care visit on 15 September.  He was  "recommended to see a colorectal surgeon he has not made an appointment yet.      He states that he has been taking his blood pressure medication daily, does not have any side effects that he has noticed he has been taking blood pressure readings, and these are generally within the 120s /70s to low 80s.  I reviewed this log with the patient and agree that his average is below 130/80.        Review of Systems   Constitutional, HEENT, cardiovascular, pulmonary, gi and gu systems are negative, except as otherwise noted.      Objective    BP (!) 162/94   Pulse 92   Temp 98  F (36.7  C) (Oral)   Resp 18   Ht 1.88 m (6' 2\")   Wt 69.2 kg (152 lb 9.6 oz)   SpO2 98%   BMI 19.59 kg/m    Body mass index is 19.59 kg/m .  Physical Exam   GENERAL: healthy, alert and no distress  EYES: Eyes grossly normal to inspection, PERRL and conjunctivae and sclerae normal  HENT: ear canals and TM's normal, nose and mouth without ulcers or lesions  NECK: no adenopathy, no asymmetry, masses, or scars and thyroid normal to palpation  RESP: lungs clear to auscultation - no rales, rhonchi or wheezes  CV: regular rate and rhythm, normal S1 S2, no S3 or S4, no murmur, click or rub, no peripheral edema and peripheral pulses strong  ABDOMEN: soft, nontender, no hepatosplenomegaly, no masses and bowel sounds normal  MS: no gross musculoskeletal defects noted, no edema  SKIN: no suspicious lesions or rashes  NEURO: Normal strength and tone, mentation intact and speech normal  PSYCH: mentation appears normal, affect normal/bright    Admission on 06/10/2023, Discharged on 06/11/2023   Component Date Value Ref Range Status    Sodium 06/10/2023 136  136 - 145 mmol/L Final    Potassium 06/10/2023 4.0  3.5 - 5.0 mmol/L Final    Chloride 06/10/2023 98  98 - 107 mmol/L Final    Carbon Dioxide (CO2) 06/10/2023 28  22 - 31 mmol/L Final    Anion Gap 06/10/2023 10  5 - 18 mmol/L Final    Urea Nitrogen 06/10/2023 8  8 - 22 mg/dL Final    Creatinine 06/10/2023 " 0.82  0.70 - 1.30 mg/dL Final    Calcium 06/10/2023 9.5  8.5 - 10.5 mg/dL Final    Glucose 06/10/2023 109  70 - 125 mg/dL Final    GFR Estimate 06/10/2023 >90  >60 mL/min/1.73m2 Final    eGFR calculated using 2021 CKD-EPI equation.    Bilirubin Total 06/10/2023 0.7  0.0 - 1.0 mg/dL Final    Bilirubin Direct 06/10/2023 0.2  <=0.5 mg/dL Final    Protein Total 06/10/2023 8.0  6.0 - 8.0 g/dL Final    Albumin 06/10/2023 4.4  3.5 - 5.0 g/dL Final    Alkaline Phosphatase 06/10/2023 82  45 - 120 U/L Final    AST 06/10/2023 14  0 - 40 U/L Final    ALT 06/10/2023 <9  0 - 45 U/L Final    CRP 06/10/2023 1.5 (H)  0.0 - <0.8 mg/dL Final    Procalcitonin 06/10/2023 0.09  0.00 - 0.49 ng/mL Final    Comment: Normal Procalcitonin in healthy populations: <=0.07  ng/ml     Procalcitonin Interpretation Guidelines:   Diagnosis of systemic bacterial infection/sepsis/septic shock:       <0.5 ng/mL:           Low risk of sepsis; localized bacterial infection possible       >=0.5 to <=2.0 ng/mL: Sepsis possible. Interpret in context of the specific clinical background and condition of the patient. Retest PCL within 6-24 hours.       >2.0 ng.mL:           High risk of sepsis and/or septic shock.         Antibiotic therapy may be discontinued if the current PCL is <=0.5 ng/mL or the Change in PCL (using highest PCL this admission and current PCL) is >80%. Do PCL testing on patients being treated with antibiotics for sepsis every 1-2 days.     Diagnosis of lower respiratory tract infection(LRTI) and decision making on antibiotic therapy:      <0.1 ng/ml:            Bacterial infection very unlikely. Antibiotic therapy strongly discouraged.      0.1 to 0.25 ng/mL :    Bacterial infection unlikely.                            Antibiotic therapy discouraged.      0.26 to 0.5 ng/ml:     Bacterial infection likely. Antibiotic therapy encouraged.      >0.5 ng/mL:            Bacterial infection very likely. Antibiotic therapy strongly encouraged.                    If antibiotics are not started for suspected LRTI, repeat PCL within 6-24 hours if symptoms persist or worsen.       Antibiotic therapy for LRTI may be discontinued if the PCL is <=0.25 ng/ml or the Change in PCL is >80%       Do PCL testing on patients being treated with antibiotics for LRTI every 1-2 days.     Decisions regarding antibiotic therapy should not be based solely on PCL concentrations. PCL results should be used in conjunction with the laboratory findings and clinical signs and be interpreted in the context of the patient's clinical situation.    WBC Count 06/10/2023 12.8 (H)  4.0 - 11.0 10e3/uL Final    RBC Count 06/10/2023 5.52  4.40 - 5.90 10e6/uL Final    Hemoglobin 06/10/2023 15.9  13.3 - 17.7 g/dL Final    Hematocrit 06/10/2023 47.7  40.0 - 53.0 % Final    MCV 06/10/2023 86  78 - 100 fL Final    MCH 06/10/2023 28.8  26.5 - 33.0 pg Final    MCHC 06/10/2023 33.3  31.5 - 36.5 g/dL Final    RDW 06/10/2023 14.7  10.0 - 15.0 % Final    Platelet Count 06/10/2023 304  150 - 450 10e3/uL Final    % Neutrophils 06/10/2023 81  % Final    % Lymphocytes 06/10/2023 11  % Final    % Monocytes 06/10/2023 7  % Final    % Eosinophils 06/10/2023 0  % Final    % Basophils 06/10/2023 1  % Final    % Immature Granulocytes 06/10/2023 0  % Final    NRBCs per 100 WBC 06/10/2023 0  <1 /100 Final    Absolute Neutrophils 06/10/2023 10.4 (H)  1.6 - 8.3 10e3/uL Final    Absolute Lymphocytes 06/10/2023 1.4  0.8 - 5.3 10e3/uL Final    Absolute Monocytes 06/10/2023 0.9  0.0 - 1.3 10e3/uL Final    Absolute Eosinophils 06/10/2023 0.0  0.0 - 0.7 10e3/uL Final    Absolute Basophils 06/10/2023 0.1  0.0 - 0.2 10e3/uL Final    Absolute Immature Granulocytes 06/10/2023 0.1  <=0.4 10e3/uL Final    Absolute NRBCs 06/10/2023 0.0  10e3/uL Final

## 2023-10-27 LAB — ALDOST SERPL-MCNC: 4.2 NG/DL (ref 0–31)

## 2023-10-29 LAB
ANNOTATION COMMENT IMP: ABNORMAL
METANEPHS SERPL-SCNC: 0.19 NMOL/L
NORMETANEPHRINE SERPL-SCNC: 0.96 NMOL/L
RENIN PLAS-CCNC: 0.2 NG/ML/HR

## 2023-10-30 LAB — ALDOST/RENIN PLAS-RTO: 21 {RATIO} (ref 0–25)

## 2023-11-03 DIAGNOSIS — I10 HYPERTENSION, UNSPECIFIED TYPE: ICD-10-CM

## 2023-11-03 RX ORDER — AMLODIPINE BESYLATE 5 MG/1
5 TABLET ORAL DAILY
Qty: 30 TABLET | Refills: 1 | Status: SHIPPED | OUTPATIENT
Start: 2023-11-03 | End: 2023-12-29

## 2023-11-03 NOTE — TELEPHONE ENCOUNTER
Medication Question or Refill    Contacts         Type Contact Phone/Fax    11/03/2023 01:08 PM CDT Phone (Incoming) Gurwinder Martin (Self) [phone] (M)            What medication are you calling about (include dose and sig)?:     amLODIPine (NORVASC) 5 MG tablet 30 tablet 1 9/15/2023  No   Sig - Route: Take 1 tablet (5 mg) by mouth daily - Oral     Preferred Pharmacy:    [facility] [place] [facility] AT [address] to [address] [facility] [id number]  Phone: [phone] Fax: [phone]    Controlled Substance Agreement on file:   CSA -- Patient Level:    CSA: None found at the patient level.       Who prescribed the medication?: [facility]    Do you need a refill? Yes    When did you use the medication last? Unknown    Patient offered an appointment? No, Pt states he just saw Karson Moura on 10/24/2023 and Pt thought he would have renewed his Rx at that appointment but one was never sent.    Do you have any questions or concerns?  Yes: Pt is requesting a 90 day supply with additional refills.     Could we send this information to you in [employer] or would you prefer to receive a phone call?:   Patient would prefer a phone call     Okay to leave a detailed message?: Yes at Cell number on file:    Telephone Information:   Mobile [phone]     Clementina Greer

## 2023-12-05 ENCOUNTER — OFFICE VISIT (OUTPATIENT)
Dept: FAMILY MEDICINE | Facility: CLINIC | Age: 63
End: 2023-12-05
Payer: COMMERCIAL

## 2023-12-05 VITALS
WEIGHT: 157.6 LBS | SYSTOLIC BLOOD PRESSURE: 160 MMHG | HEIGHT: 74 IN | TEMPERATURE: 98 F | DIASTOLIC BLOOD PRESSURE: 96 MMHG | OXYGEN SATURATION: 98 % | BODY MASS INDEX: 20.23 KG/M2 | RESPIRATION RATE: 12 BRPM | HEART RATE: 83 BPM

## 2023-12-05 DIAGNOSIS — I10 HYPERTENSION, UNSPECIFIED TYPE: ICD-10-CM

## 2023-12-05 DIAGNOSIS — Z12.11 SCREEN FOR COLON CANCER: Primary | ICD-10-CM

## 2023-12-05 DIAGNOSIS — K61.2 ABSCESS OF ANAL OR RECTAL REGION: ICD-10-CM

## 2023-12-05 DIAGNOSIS — Z11.59 NEED FOR HEPATITIS C SCREENING TEST: ICD-10-CM

## 2023-12-05 DIAGNOSIS — E27.8 ADRENAL INCIDENTALOMA (H): ICD-10-CM

## 2023-12-05 DIAGNOSIS — Z87.891 PERSONAL HISTORY OF TOBACCO USE: ICD-10-CM

## 2023-12-05 DIAGNOSIS — Z11.4 SCREENING FOR HIV (HUMAN IMMUNODEFICIENCY VIRUS): ICD-10-CM

## 2023-12-05 DIAGNOSIS — I83.93 ASYMPTOMATIC VARICOSE VEINS OF BOTH LOWER EXTREMITIES: ICD-10-CM

## 2023-12-05 DIAGNOSIS — F17.200 NICOTINE DEPENDENCE, UNCOMPLICATED, UNSPECIFIED NICOTINE PRODUCT TYPE: ICD-10-CM

## 2023-12-05 PROCEDURE — 99407 BEHAV CHNG SMOKING > 10 MIN: CPT | Performed by: STUDENT IN AN ORGANIZED HEALTH CARE EDUCATION/TRAINING PROGRAM

## 2023-12-05 PROCEDURE — 99214 OFFICE O/P EST MOD 30 MIN: CPT | Mod: 25 | Performed by: STUDENT IN AN ORGANIZED HEALTH CARE EDUCATION/TRAINING PROGRAM

## 2023-12-05 PROCEDURE — G0296 VISIT TO DETERM LDCT ELIG: HCPCS | Performed by: STUDENT IN AN ORGANIZED HEALTH CARE EDUCATION/TRAINING PROGRAM

## 2023-12-05 NOTE — PROGRESS NOTES
Assessment & Plan     Hypertension, unspecified type  Has hypertension, which appears to be much worse in the office than at home per his blood pressure log.  He has been taking amlodipine 5 mg, with blood pressures averaging 120s over 70s to low 80s at home.  He brought in his blood pressure cuff today, an [employer] device, which measured about the same, within 5 mmHg of the measurement with our blood pressure cuff.  Today in the office systolic of 160 diastolic of 96.  Patient has whitecoat hypertension, home, we will continue the current amlodipine 5 mg daily.     We did attempt to evaluate for pheochromocytoma given the adrenal incidentaloma found, and results were equivocal, recommend for 24-hour urine, lab order placed.    Adrenal incidentaloma    Patient had bilateral adrenal incidentaloma's seen on imaging in June 2023, measuring 3.0 x 2.3 cm on the left, and 3.2 x 2.0 cm on the right.  On lab work not consistent with primary aldosteronism.  Given low blood pressures at home and high blood pressures here could also consider possible pheochromocytoma, had equivocal metanephrine workup.  As above recommend for 24-hour urine study, this was ordered for the patient.  Previously recommended repeat CT abdomen to better assess the attenuation of these adrenal nodules, given the size.  Patient was reticent to do this, I encouraged him to get his CT scan done, he will think about it.    Screen for colon cancer  Discussed screening for colon cancer, he declined colon cancer screening although colonoscopy and Cologuard were offered.  Will follow-up at next visit.    Screening for HIV (human immunodeficiency virus)  Offered and declined.    Need for hepatitis C screening test  Offered and declined.    Asymptomatic varicose veins of both lower extremities  Has asymptomatic varicose veins, wears compression stockings.  Previously had ulceration related to venous stasis which improved after wearing compression  stockings.    Nicotine dependence, uncomplicated, unspecified nicotine product type  Patient is smoked for more than 40 years, pack per day, has not been able to stop on his own.  We discussed the risks of continued smoking, specifically on his lungs which appear to be emphysematous on the last CT scan, I explained my worry for the development of COPD, as well as risk for lung cancer, heart disease, worsening of his vascular disease in his legs.  He reports understanding, he wishes to attempt the nicotine gum only, although we did discuss in length Zyban, Chantix, patches, without patient wishing to attempt any of these.  He will attempt the gum, we will follow-up in 1 to 2 months.  - nicotine polacrilex (NICORETTE) 4 MG gum  Dispense: 100 each; Refill: 5  - SMOKING CESSATION COUNSELING >10 MIN  - Prof Fee: Shared Decision Making Visit for Lung Cancer Screening    Personal history of tobacco use  Given his over 20-year use of nicotine/cigarettes, recommended for lung cancer screening, we discussed lung cancer screening benefits and risks, after this discussion he wished to not have CT lung cancer screening.  We will follow-up at future visits.    - Prof Fee: Shared Decision Making Visit for Lung Cancer Screening    Abscess of anal or rectal region  Patient wished to have some type of cleanser for his rectal region, recommended Hibiclens, he will use this as needed.  - chlorhexidine (HIBICLENS) 4 % liquid  Dispense: 118 mL; Refill: 0               Follow-up in 1 to 2 months    Karson Moura MD  [facility]    Campbell Purdy is a 63 year old, presenting for the following health issues:  Follow Up (1 month follow up.)        12/5/2023     2:25 PM   Additional Questions   Roomed by Lacy RUIZ       History of Present Illness       Hypertension: He presents for follow up of hypertension.  He does check blood pressure  regularly outside of the clinic. Outside blood pressures have been  "over 140/90. He does not follow a low salt diet.     He eats 0-1 servings of fruits and vegetables daily.He consumes 0 sweetened beverage(s) daily.He exercises with enough effort to increase his heart rate 20 to 29 minutes per day.  He exercises with enough effort to increase his heart rate 3 or less days per week.   He is taking medications regularly.     Has been trying to stop smoking by decreasing his cigarette use or smoking only half of the cigarette. Smokes outside usually not in the house.     The 10-year ASCVD risk score (Dayanara MELO, et al., 2019) is: 23.4%    Values used to calculate the score:      Age: 63 years      Sex: Male      Is Non- : No      Diabetic: No      Tobacco smoker: Yes      Systolic Blood Pressure: 160 mmHg      Is BP treated: Yes      HDL Cholesterol: 61 mg/dL      Total Cholesterol: 186 mg/dL              Review of Systems   Constitutional, HEENT, cardiovascular, pulmonary, GI, , musculoskeletal, neuro, skin, endocrine and psych systems are negative, except as otherwise noted.      Objective    BP (!) 160/96 (BP Location: Right arm, Patient Position: Sitting, Cuff Size: Adult Regular)   Pulse 83   Temp 98  F (36.7  C) (Oral)   Resp 12   Ht 1.88 m (6' 2\")   Wt 71.5 kg (157 lb 9.6 oz)   SpO2 98%   BMI 20.23 kg/m    Body mass index is 20.23 kg/m .  Physical Exam   GENERAL: healthy, alert and no distress  EYES: Eyes grossly normal to inspection and conjunctivae and sclerae normal  MS: no gross musculoskeletal defects noted, no edema  SKIN: no suspicious lesions or rashes  NEURO: Normal strength and tone, mentation intact and speech normal  PSYCH: mentation appears normal, affect normal/bright    Office Visit on 10/24/2023   Component Date Value Ref Range Status    Cholesterol 10/24/2023 186  <200 mg/dL Final    Triglycerides 10/24/2023 101  <150 mg/dL Final    Direct Measure HDL 10/24/2023 61  >=40 mg/dL Final    LDL Cholesterol Calculated 10/24/2023 105 (H) "  <=100 mg/dL Final    Non HDL Cholesterol 10/24/2023 125  <130 mg/dL Final    Sodium 10/24/2023 140  135 - 145 mmol/L Final    Reference intervals for this test were updated on 09/26/2023 to more accurately reflect our healthy population. There may be differences in the flagging of prior results with similar values performed with this method. Interpretation of those prior results can be made in the context of the updated reference intervals.     Potassium 10/24/2023 4.2  3.4 - 5.3 mmol/L Final    Carbon Dioxide (CO2) 10/24/2023 28  22 - 29 mmol/L Final    Anion Gap 10/24/2023 10  7 - 15 mmol/L Final    Urea Nitrogen 10/24/2023 13.3  8.0 - 23.0 mg/dL Final    Creatinine 10/24/2023 0.72  0.67 - 1.17 mg/dL Final    GFR Estimate 10/24/2023 >90  >60 mL/min/1.73m2 Final    Calcium 10/24/2023 9.6  8.8 - 10.2 mg/dL Final    Chloride 10/24/2023 102  98 - 107 mmol/L Final    Glucose 10/24/2023 97  70 - 99 mg/dL Final    Alkaline Phosphatase 10/24/2023 70  40 - 129 U/L Final    AST 10/24/2023 23  0 - 45 U/L Final    Reference intervals for this test were updated on 6/12/2023 to more accurately reflect our healthy population. There may be differences in the flagging of prior results with similar values performed with this method. Interpretation of those prior results can be made in the context of the updated reference intervals.    ALT 10/24/2023 18  0 - 70 U/L Final    Reference intervals for this test were updated on 6/12/2023 to more accurately reflect our healthy population. There may be differences in the flagging of prior results with similar values performed with this method. Interpretation of those prior results can be made in the context of the updated reference intervals.      Protein Total 10/24/2023 7.6  6.4 - 8.3 g/dL Final    Albumin 10/24/2023 4.5  3.5 - 5.2 g/dL Final    Bilirubin Total 10/24/2023 0.6  <=1.2 mg/dL Final    Metanephrine 10/24/2023 0.19  0.00 - 0.49 nmol/L Final    Normetanephrine 10/24/2023 0.96  (H)  0.00 - 0.89 nmol/L Final    Metanephrines Interpretation 10/24/2023 See Note   Final    Comment: INTERPRETIVE INFORMATION: Metanephrines, Plasma (Free)    This test is useful in the detection of pheochromocytoma, a   rare neuroendocrine tumor. The majority of patients with   pheochromocytoma have a plasma normetanephrine   concentration in excess of 2.2 nmol/L and/or a metanephrine   concentration in excess of 1.1 nmol/L. Increased   concentrations of these analytes serve as confirmation for   diagnosis. Patients with essential hypertension and plasma   concentrations of normetanephrine below 0.9 nmol/L and a   metanephrine concentration below 0.5 nmol/L, can be   excluded from further testing. If clinical suspicion   remains, repeat testing or testing for metanephrines in a   24-hr. urine specimen should be considered.    This test was developed and its performance characteristics   determined by [facility]. It has not been cleared or   approved by the US Food and Drug Administration. This test   was performed in a CLIA certified laboratory and is   intended for clinical                            purposes.  Performed By: [facility]  [address] [place] [id number]  : Noe Hamilton MD, PhD  CLIA Number: [id number]    Hemoglobin A1C 10/24/2023 5.5  0.0 - 5.6 % Final    Normal <5.7%   Prediabetes 5.7-6.4%    Diabetes 6.5% or higher     Note: Adopted from ADA consensus guidelines.    Aldosterone 10/24/2023 4.2  0.0 - 31.0 ng/dL Final    Renin Activity 10/24/2023 0.2  ng/mL/hr Final    Comment: INTERPRETIVE INFORMATION: Renin Activity    Adult, Normal sodium diet:    Supine ................. 0.2-1.6 ng/mL/hr    Upright ................ 0.5-4.0 ng/mL/hr    Children, Normal sodium diet, Supine:    [place] (1-7 days) ..... 2.0-35.0 ng/mL/hr    Cord blood ............. 4.0-32.0 ng/mL/hr    1-12 mos ............... 2.4-37.0 ng/mL/hr    13 mos-3 yrs ........... 1.7-11.2  ng/mL/hr    4-5 yrs ................ 1.0- 6.5 ng/mL/hr    6-10 yrs ............... 0.5- 5.9 ng/mL/hr    11-15 yrs .............. 0.5- 3.3 ng/mL/hr    Children, normal sodium diet, Upright:    0-3 yrs ................ Not Available    4-5 yrs ................ Less than or equal to 15 ng/mL/hr    6-10 yrs ............... Less than or equal to 17 ng/mL/hr    11-15 yrs .............. Less than or equal to 16 ng/mL/hr    Plasma renin activity measures enzyme ability to convert   angiotensinogen to angiotensin I and is limited by the   availability of angiotensinogen. Plasma renin activity is   not an accurate indicator                            of enzyme activity when   angiotensinogen is decreased.    This test was developed and its performance characteristics   determined by [facility]. It has not been cleared or   approved by the US Food and Drug Administration. This test   was performed in a CLIA certified laboratory and is   intended for clinical purposes.  Performed By: [facility]  [address] [place] [id number]  : Noe Hamilton MD, PhD  CLIA Number: [id number]    Aldosterone Renin Ratio 10/24/2023 21.0  0.0 - 25.0 Final                       Lung Cancer Screening Shared Decision Making Visit     Gurwinder Martin, a 63 year old male, is eligible for lung cancer screening    History   Smoking Status    Every Day    Types: Cigarettes   Smokeless Tobacco    Not on file       I have discussed with patient the risks and benefits of screening for lung cancer with low-dose CT.     The risks include:    radiation exposure: one low dose chest CT has as much ionizing radiation as about 15 chest x-rays, or 6 months of background radiation living in [place]      false positives: most findings/nodules are NOT cancer, but some might still require additional diagnostic evaluation, including biopsy    over-diagnosis: some slow growing cancers that might never have been clinically  significant will be detected and treated unnecessarily     The benefit of early detection of lung cancer is contingent upon adherence to annual screening or more frequent follow up if indicated.     Furthermore, to benefit from screening, Gurwinder must be willing and able to undergo diagnostic procedures, if indicated. Although no specific guide is available for determining severity of comorbidities, it is reasonable to withhold screening in patients who have greater mortality risk from other diseases.     We did discuss that the best way to prevent lung cancer is to not smoke.    Some patients may value a numeric estimation of lung cancer risk when evaluating if lung cancer screening is right for them, here is one calculator:    ShouldIScreen    Gurwinder Martin has declined screening at this time

## 2023-12-05 NOTE — PATIENT INSTRUCTIONS
Nicotine Chewing Gum (NICOTINE GUM - BUCCAL)  For quitting smoking.  Brand Name(s): Nicorelief, Nicorette, Thrive  Generic Name: Nicotine  Instructions  Chew the gum when you feel the urge to smoke. Chew very slowly until it tingles, then move it to the space between your cheek and gum. Keep it there until it stops tingling. When the tingle is gone, begin chewing the gum again until the tingle returns. Most of the nicotine will be gone after 30 minutes.  Do not eat or drink for 15 minutes before or during use of the gum.  Store at room temperature away from heat, light, and moisture. Do not keep in the bathroom.  Tell your doctor and pharmacist about all your medicines. Include prescription and over-the-counter medicines, vitamins, and herbal medicines.  Keep using this medicine for the full number of days that it is prescribed. Do not stop the medicine even if you start to feel better.  This medicine contains sodium. If you are on a low sodium diet, consider this as part of your total sodium diet.  If you have been told to follow a low sodium diet, speak to your doctor before using this medicine.  Do not take the medicine more than 24 times during 24 hours.  Cautions  Tell your doctor and pharmacist if you ever had an allergic reaction to a medicine.  Do not use the medication any more than instructed.  Avoid smoking while on this medicine. Smoking may increase your risk for stroke, heart attack, blood clots, high blood pressure, and other diseases of the heart and blood vessels.  Tell the doctor or pharmacist if you are pregnant, planning to be pregnant, or breastfeeding.  Please tell all your doctors and dentists that you are on this medicine before they provide care.  Side Effects  The following is a list of some common side effects from this medicine. Please speak with your doctor about what you should do if you experience these or other side effects.  jaw pain  irritation of mouth and gum tissue  If you have  any of the following side effects, you may be getting too much medicine. Please contact your doctor to let them know about these side effects.  diarrhea  dizziness  rapid heartbeat  nausea and vomiting  weakness  A few people may have an allergic reaction to this medicine. Symptoms can include difficulty breathing, skin rash, itching, swelling, or severe dizziness. If you notice any of these symptoms, seek medical help quickly.  Please speak with your doctor, nurse, or pharmacist if you have any questions about this medicine.  IMPORTANT NOTE: This document tells you briefly how to take your medicine, but it does not tell you all there is to know about it. Your doctor or pharmacist may give you other documents about your medicine. Please talk to them if you have any questions. Always follow their advice.  There is a more complete description of this medicine available in English. Scan this code on your smartphone or tablet or use the web address below. You can also ask your pharmacist for a printout. If you have any questions, please ask your pharmacist.  The display and use of this drug information is subject to Terms of Use.  More information about NICOTINE GUM - BUCCAL      Copyright(c) 2023 [employer]  Selected from data included with permission and copyright by [employer]. This copyrighted material has been downloaded from a licensed data provider and is not for distribution, except as may be authorized by the applicable terms of use.  Conditions of Use: The information in this database is intended to supplement, not substitute for the expertise and judgment of healthcare professionals. The information is not intended to cover all possible uses, directions, precautions, drug interactions or adverse effects nor should it be construed to indicate that use of a particular drug is safe, appropriate or effective for you or anyone else. A healthcare professional should be consulted before taking any  drug, changing any diet or commencing or discontinuing any course of treatment. The display and use of this drug information is subject to express Terms of Use.  Care instructions adapted under license by your healthcare professional. If you have questions about a medical condition or this instruction, always ask your healthcare professional. Healthwise, [facility] disclaims any warranty or liability for your use of this information.    Lung Cancer Screening   Frequently Asked Questions  If you are at high-risk for lung cancer, getting screened with low-dose computed tomography (LDCT) every year can help save your life. This handout offers answers to some of the most common questions about lung cancer screening. If you have other questions, please call [phone] ([phone]).     What is it?  Lung cancer screening uses special X-ray technology to create an image of your lung tissue. The exam is quick and easy and takes less than 10 seconds. We don t give you any medicine or use any needles. You can eat before and after the exam. You don t need to change your clothes as long as the clothing on your chest doesn t contain metal. But, you do need to be able to hold your breath for at least 6 seconds during the exam.    What is the goal of lung cancer screening?  The goal of lung cancer screening is to save lives. Many times, lung cancer is not found until a person starts having physical symptoms. Lung cancer screening can help detect lung cancer in the earliest stages when it may be easier to treat.    Who should be screened for lung cancer?  We suggest lung cancer screening for anyone who is at high-risk for lung cancer. You are in the high-risk group if you:      are between the ages of 55 and 79, and    have smoked at least 1 pack of cigarettes a day for 20 or more years, and    still smoke or have quit within the past 15 years.    However, if you have a new cough or shortness of breath, you should talk  to your doctor before being screened.    Why does it matter if I have symptoms?  Certain symptoms can be a sign that you have a condition in your lungs that should be checked and treated by your doctor. These symptoms include fever, chest pain, a new or changing cough, shortness of breath that you have never felt before, coughing up blood or unexplained weight loss. Having any of these symptoms can greatly affect the results of lung cancer screening.       Should all smokers get an LDCT lung cancer screening exam?  It depends. Lung cancer screening is for a very specific group of men and women who have a history of heavy smoking over a long period of time (see  Who should be screened for lung cancer  above).  I am in the high-risk group, but have been diagnosed with cancer in the past. Is LDCT lung cancer screening right for me?  In some cases, you should not have LDCT lung screening, such as when your doctor is already following your cancer with CT scan studies. Your doctor will help you decide if LDCT lung screening is right for you.  Do I need to have a screening exam every year?  Yes. If you are in the high-risk group described earlier, you should get an LDCT lung cancer screening exam every year until you are 79, or are no longer willing or able to undergo screening and possible procedures to diagnose and treat lung cancer.  How effective is LDCT at preventing death from lung cancer?  Studies have shown that LDCT lung cancer screening can lower the risk of death from lung cancer by 20 percent in people who are at high-risk.  What are the risks?  There are some risks and limitations of LDCT lung cancer screening. We want to make sure you understand the risks and benefits, so please let us know if you have any questions. Your doctor may want to talk with you more about these risks.    Radiation exposure: As with any exam that uses radiation, there is a very small increased risk of cancer. The amount of radiation  in LDCT is small--about the same amount a person would get from a mammogram. Your doctor orders the exam when he or she feels the potential benefits outweigh the risks.    False negatives: No test is perfect, including LDCT. It is possible that you may have a medical condition, including lung cancer, that is not found during your exam. This is called a false negative result.    False positives and more testing: LDCT very often finds something in the lung that could be cancer, but in fact is not. This is called a false positive result. False positive tests often cause anxiety. To make sure these findings are not cancer, you may need to have more tests. These tests will be done only if you give us permission. Sometimes patients need a treatment that can have side effects, such as a biopsy. For more information on false positives, see  What can I expect from the results?     Findings not related to lung cancer: Your LDCT exam also takes pictures of areas of your body next to your lungs. In a very small number of cases, the CT scan will show an abnormal finding in one of these areas, such as your kidneys, adrenal glands, liver or thyroid. This finding may not be serious, but you may need more tests. Your doctor can help you decide what other tests you may need, if any.  What can I expect from the results?  About 1 out of 4 LDCT exams will find something that may need more tests. Most of the time, these findings are lung nodules. Lung nodules are very small collections of tissue in the lung. These nodules are very common, and the vast majority--more than 97 percent--are not cancer (benign). Most are normal lymph nodes or small areas of scarring from past infections.  But, if a small lung nodule is found to be cancer, the cancer can be cured more than 90 percent of the time. To know if the nodule is cancer, we may need to get more images before your next yearly screening exam. If the nodule has suspicious features (for  example, it is large, has an odd shape or grows over time), we will refer you to a specialist for further testing.  Will my doctor also get the results?  Yes. Your doctor will get a copy of your results.  Is it okay to keep smoking now that there s a cancer screening exam?  No. Tobacco is one of the strongest cancer-causing agents. It causes not only lung cancer, but other cancers and cardiovascular (heart) diseases as well. The damage caused by smoking builds over time. This means that the longer you smoke, the higher your risk of disease. While it is never too late to quit, the sooner you quit, the better.  Where can I find help to quit smoking?  The best way to prevent lung cancer is to stop smoking. If you have already quit smoking, congratulations and keep it up! For help on quitting smoking, please call [facility] [employer] at 0-609-QUITNOW (1-458.412.6763) or the American [employer] Society at 1-292.839.7151 to find local resources near you.  One-on-one health coaching:  If you d prefer to work individually with a health care provider on tobacco cessation, we offer:      Medication Therapy Management:  Our specially trained pharmacists work closely with you and your doctor to help you quit smoking.  Call [phone] or [phone] (toll free).

## 2023-12-07 DIAGNOSIS — F17.200 NICOTINE DEPENDENCE, UNCOMPLICATED, UNSPECIFIED NICOTINE PRODUCT TYPE: ICD-10-CM

## 2023-12-29 DIAGNOSIS — I10 HYPERTENSION, UNSPECIFIED TYPE: ICD-10-CM

## 2023-12-30 RX ORDER — AMLODIPINE BESYLATE 5 MG/1
5 TABLET ORAL DAILY
Qty: 30 TABLET | Refills: 1 | Status: SHIPPED | OUTPATIENT
Start: 2023-12-30 | End: 2024-02-28

## 2024-02-28 DIAGNOSIS — I10 HYPERTENSION, UNSPECIFIED TYPE: ICD-10-CM

## 2024-02-28 RX ORDER — AMLODIPINE BESYLATE 5 MG/1
5 TABLET ORAL DAILY
Qty: 30 TABLET | Refills: 1 | Status: SHIPPED | OUTPATIENT
Start: 2024-02-28 | End: 2024-05-13

## 2024-02-28 NOTE — TELEPHONE ENCOUNTER
Refill request from pharmacy via fax.  Pending Prescriptions:                       Disp   Refills    amLODIPine (NORVASC) 5 MG tablet          30 tab*1            Sig: Take 1 tablet (5 mg) by mouth daily    Last OV: 12/05/2023  Next OV: None

## 2024-05-11 ENCOUNTER — HEALTH MAINTENANCE LETTER (OUTPATIENT)
Age: 64
End: 2024-05-11

## 2024-05-13 DIAGNOSIS — I10 HYPERTENSION, UNSPECIFIED TYPE: ICD-10-CM

## 2024-05-13 RX ORDER — AMLODIPINE BESYLATE 5 MG/1
5 TABLET ORAL DAILY
Qty: 30 TABLET | Refills: 0 | Status: SHIPPED | OUTPATIENT
Start: 2024-05-13 | End: 2024-06-13

## 2024-05-13 RX ORDER — AMLODIPINE BESYLATE 5 MG/1
5 TABLET ORAL DAILY
Qty: 30 TABLET | Refills: 1 | Status: CANCELLED | OUTPATIENT
Start: 2024-05-13

## 2024-05-13 RX ORDER — AMLODIPINE BESYLATE 5 MG/1
5 TABLET ORAL DAILY
Qty: 90 TABLET | Refills: 1 | Status: SHIPPED | OUTPATIENT
Start: 2024-06-13

## 2024-05-13 NOTE — TELEPHONE ENCOUNTER
05/13/2024    Pt is requesting a new Rx for a 30 day supply to be sent to [facility] [address] @ [facility] [address] in [place], [place].    amLODIPine (NORVASC) 5 MG tablet  5 mg, DAILY 1 ordered           Summary: Take 1 tablet (5 mg) by mouth daily, Disp-30 tablet, R-1, E-Prescribe          Pt is also requesting a separate Rx for a 90 day supply to be sent to [email or web address] mail order pharmacy. Pt stated he only has 4 pills left.     Clementina Greer

## 2024-06-17 ENCOUNTER — TELEPHONE (OUTPATIENT)
Dept: FAMILY MEDICINE | Facility: CLINIC | Age: 64
End: 2024-06-17
Payer: COMMERCIAL

## 2024-06-17 NOTE — TELEPHONE ENCOUNTER
Medication Question or Refill    Contacts         Type Contact Phone/Fax    06/17/2024 02:12 PM CDT Phone (Incoming) Gurwinder Martin (Self) [phone] (M)            What medication are you calling about (include dose and sig)?:   amLODIPine (NORVASC) 5 MG tablet 90 tablet 1 6/13/2024 -- No       Preferred Pharmacy:   [facility] [id number] [place] [address] [employer] [address] [facility] [address]  Phone: [phone] Fax: [phone]      Controlled Substance Agreement on file:   CSA -- Patient Level:    CSA: None found at the patient level.       Who prescribed the medication?: PCP    Do you need a refill? Yes    When did you use the medication last? 06/17/2024    Do you have any questions or concerns?  Yes: [facility] says that they did not receive RX.  Pt would like 30 day supply to go to [facility] so he can set up mail order prescription. Please send a 30 prescription to [facility].       Could we send this information to you in [facility] or would you prefer to receive a phone call?:   Patient would prefer a phone call   Okay to leave a detailed message?: Yes at Cell number on file:    Telephone Information:   Mobile [phone]       Colby Conti

## 2024-06-17 NOTE — TELEPHONE ENCOUNTER
Outgoing call to pharmacy-They said that patient only has a partial account set up.     Patient just needs to give them a Phone call to complete the account [phone] option number 1.    Outgoing call to relay this to the patient. [facility] may be able to get medications to patient tomorrow depending on if he calls tonight.     I told patient to call us back if he can't get medication tomorrow.     Janel RIVERS RN

## 2024-06-17 NOTE — TELEPHONE ENCOUNTER
Prescription sent to [facility] delivery pharmacy on 6/13/24.      Routing to RN team:    Please confirm the pharmacy patient wants prescription at?  Socorro BLACKMONN, RN

## 2024-07-20 ENCOUNTER — HEALTH MAINTENANCE LETTER (OUTPATIENT)
Age: 64
End: 2024-07-20

## 2024-08-18 ENCOUNTER — OFFICE VISIT (OUTPATIENT)
Dept: FAMILY MEDICINE | Facility: CLINIC | Age: 64
End: 2024-08-18
Payer: COMMERCIAL

## 2024-08-18 VITALS
BODY MASS INDEX: 19.9 KG/M2 | WEIGHT: 155 LBS | DIASTOLIC BLOOD PRESSURE: 90 MMHG | HEART RATE: 110 BPM | TEMPERATURE: 98.1 F | SYSTOLIC BLOOD PRESSURE: 155 MMHG | OXYGEN SATURATION: 98 % | RESPIRATION RATE: 16 BRPM

## 2024-08-18 DIAGNOSIS — K61.1 PERIRECTAL ABSCESS: Primary | ICD-10-CM

## 2024-08-18 PROCEDURE — 99214 OFFICE O/P EST MOD 30 MIN: CPT | Performed by: FAMILY MEDICINE

## 2024-08-18 RX ORDER — CLINDAMYCIN HCL 300 MG
300 CAPSULE ORAL 4 TIMES DAILY
Qty: 40 CAPSULE | Refills: 0 | Status: SHIPPED | OUTPATIENT
Start: 2024-08-18 | End: 2024-08-28
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
## 2024-08-22 ENCOUNTER — TELEPHONE (OUTPATIENT)
Dept: FAMILY MEDICINE | Facility: CLINIC | Age: 64
End: 2024-08-22
Payer: COMMERCIAL

## 2024-08-22 NOTE — TELEPHONE ENCOUNTER
Updating that his perirectal abscess is slightly improving pain-wise but seems like the size has increased. He also states he has ibuprofen PRN if pain occurs. He is MCFP the antibiotic treatment. RN asked patient that for now continue to monitor and keep the area clean, and give us an update before he finishes his abx course if he feels it is worsening or not improving. Patient agreed and will follow disposition.

## 2024-08-26 ENCOUNTER — OFFICE VISIT (OUTPATIENT)
Dept: FAMILY MEDICINE | Facility: CLINIC | Age: 64
End: 2024-08-26
Payer: COMMERCIAL

## 2024-08-26 VITALS
TEMPERATURE: 98.6 F | SYSTOLIC BLOOD PRESSURE: 165 MMHG | OXYGEN SATURATION: 99 % | RESPIRATION RATE: 20 BRPM | HEART RATE: 93 BPM | DIASTOLIC BLOOD PRESSURE: 94 MMHG

## 2024-08-26 DIAGNOSIS — K61.0 PERIANAL ABSCESS: Primary | ICD-10-CM

## 2024-08-26 DIAGNOSIS — L02.31 CUTANEOUS ABSCESS OF BUTTOCK: ICD-10-CM

## 2024-08-26 DIAGNOSIS — L02.31 LEFT BUTTOCK ABSCESS: ICD-10-CM

## 2024-08-26 PROCEDURE — 87070 CULTURE OTHR SPECIMN AEROBIC: CPT | Performed by: PHYSICIAN ASSISTANT

## 2024-08-26 PROCEDURE — 87077 CULTURE AEROBIC IDENTIFY: CPT | Performed by: PHYSICIAN ASSISTANT

## 2024-08-26 PROCEDURE — 46050 I&D PERIANAL ABSCESS SUPFC: CPT | Performed by: PHYSICIAN ASSISTANT

## 2024-08-26 PROCEDURE — 87186 SC STD MICRODIL/AGAR DIL: CPT | Performed by: PHYSICIAN ASSISTANT
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
## 2024-08-26 NOTE — PATIENT INSTRUCTIONS
Finish the antibiotic you are on. We will call with culture results if a change is needed.      Warm water and a mild soap to wash 2 x per day.    OK to let water run over the area, pat dry and apply gauze dressing.      Follow up with general surgery

## 2024-08-29 ENCOUNTER — TELEPHONE (OUTPATIENT)
Dept: SURGERY | Facility: CLINIC | Age: 64
End: 2024-08-29
Payer: COMMERCIAL

## 2024-08-29 LAB
BACTERIA ABSC ANAEROBE+AEROBE CULT: ABNORMAL

## 2024-08-29 RX ORDER — CEPHALEXIN 500 MG/1
500 CAPSULE ORAL 3 TIMES DAILY
Qty: 15 CAPSULE | Refills: 0 | Status: SHIPPED | OUTPATIENT
Start: 2024-08-29 | End: 2024-09-03

## 2024-08-29 NOTE — TELEPHONE ENCOUNTER
[facility] Call Center    Phone Message    May a detailed message be left on voicemail: yes     Reason for Call: Other: Pt is looking to be seen for a rodríguez-anal abscess. HP TE being sent per guidelines for assessment. Please call back as soon as possible to discuss.     Action Taken: Message routed to:  [facility] TIN    Travel Screening: Not Applicable     Date of Service:

## 2024-08-29 NOTE — PROGRESS NOTES
Addendum    Chart reviewed, I+D 8/26 with culture growing multiple bacteria. Called patient and he completed Clindamycin course. He continues to have pain with some swelling and drainage, no fever. Based on sensitivities, will treat with Keflex for 5d, continue warm compress, Tylenol for pain. Patient had non-anaphylactic allergy to Penicillin so low concern for cross-reaction.    Advised Gurwinder that if symptoms continue through Saturday, please return for evaluation    Questions asked and answered.    Marissa Mcconnell,   Covering Urgent Care Physician

## 2024-08-30 NOTE — TELEPHONE ENCOUNTER
Diagnosis, Referred by & from: F/U I&D   Appt date: 9/3/2024   NOTES STATUS DETAILS   OFFICE NOTE from referring provider Internal [facility]:  8/26/24 - [facility] OV with ISIS Mijares   OFFICE NOTE from other specialist [facility]:  8/18/24 - [facility] OV with Dr. Diallo  12/5/23, 10/24/23 - [facility] OV with Dr. Moura  9/15/23 - [facility] OV with ISIS Vo    [facility]:  6/16/23 - [facility] OV with Dr. Barrow   DISCHARGE SUMMARY from hospital N/A    DISCHARGE REPORT from the ER [facility]:  6/10/23 - ED OV with Dr. Hernandez   OPERATIVE REPORT N/A    MEDICATION LIST Internal    LABS N/A    DIAGNOSTIC PROCEDURES N/A    IMAGING (DISC & REPORT)      CT Internal [facility]:  6/10/23 - CT Abd/Pelvis

## 2024-09-02 NOTE — PROGRESS NOTES
"Colon and Rectal Surgery Clinic Note     RE:      Gurwinder Martin.  :   1960.  RADHA:   9/10/2024.     Reason for visit: perianal abscess.     HPI: Gurwinder Martin is a 64 year old male who presents today for a perianal abscess. He has a past medical history of HTN, bilateral adrenal incidentaloma, and varicose veins. He is s/p I and D of a perianal abscess on 2024. He was prescribed clindamycin but this was switched to Keflex.      Today Isacc feels fine. He is a current smoker. Never had a colonoscopy.        Medical history:  HTN  Bilateral adrenal incidentaloma  Varicose veins      Surgical history:  Past Surgical History   No past surgical history on file.        Family history:  Family History   No family history on file.       Medications:  Current Outpatient Prescriptions          Current Outpatient Medications   Medication Sig Dispense Refill    amLODIPine (NORVASC) 5 MG tablet Take 1 tablet (5 mg) by mouth daily for 31 days 30 tablet 0    amLODIPine (NORVASC) 5 MG tablet Take 1 tablet (5 mg) by mouth daily 90 tablet 1    cephALEXin (KEFLEX) 500 MG capsule Take 1 capsule (500 mg) by mouth 3 times daily for 5 days. 15 capsule 0    chlorhexidine (HIBICLENS) 4 % liquid Apply topically daily as needed for other (for cleansing) 118 mL 0    ibuprofen (ADVIL,MOTRIN) 600 MG tablet [IBUPROFEN (ADVIL,MOTRIN) 600 MG TABLET] Take 600 mg by mouth every 6 (six) hours as needed for pain.        nicotine polacrilex (NICORETTE) 4 MG gum How to take it: When the urge to smoke occurs, chew gum until you feel a tingle, then \"park\" the gum in your cheek until the tingle is gone. Re-chew every few minutes and \"park\" again, chewing one piece for 30 minutes. Do not eat or drink while chewing. Follow this schedule: Weeks 1 to 6: One piece of gum every 1 to 2 hours. Use at least 9 pieces a day, but no more than 24. Weeks 7 to 9: One piece of gum every 2 to 4 hours. Weeks 10 to 12: One piece of gum every 4 to 8 hours. 100 " "each 5            Allergies:  Allergies        Allergies   Allergen Reactions    Penicillins Unknown            Social history:   Social History            Tobacco Use    Smoking status: Every Day       Types: Cigarettes       Passive exposure: Current    Smokeless tobacco: Not on file   Substance Use Topics    Alcohol use: Not on file      Marital status: single.     ROS:  A complete review of systems was performed with the patient and all systems negative except as per HPI.     Physical Examination:  Exam was chaperoned by Otf Gates, EMT-P  BP (!) 161/93 (BP Location: Left arm, Patient Position: Sitting, Cuff Size: Adult Regular)   Pulse 110   Ht 1.88 m (6' 2\")   Wt 70.3 kg (155 lb)   SpO2 95%   BMI 19.90 kg/m     General: Well hydrated. No acute distress.  CV: RRR  Lung: Non-labored breathing on RA  Abdomen: Soft, NT.  Perianal external examination:  Multiple ingrown hairs, well healed incision. No evidence of fistula.  Digital rectal examination: Was deferred.          ASSESSMENT     This is a 64 year old M with a perianal abscess s/p I and D on 8/26/2024. He is recovering well. We discussed the possible risks of fistula-in-ano; he was instructed on the associated symptoms and instructed to contact our clinic if those were to develop.      All pertinent labs and imaging were personally reviewed by me.        PLAN  - Colonoscopy when possible  - Continue sitz baths  - Start a daily fiber supplement such as Citrucel or Metamucil. Start with once a day and slowly increase up to three times a day, if needed, over the next 4-6 weeks  - RTC as needed        30 minutes spent on the date of the encounter doing chart review, history and exam, imaging review, documentation and further activities as noted above.       Servando Staton MD    Division of Colon and Rectal Surgery  [facility]     Referring Provider:  Shannan David PA-C  [address] " [place] [address]      Primary Care Provider:  Karson Moura

## 2024-09-03 ENCOUNTER — PRE VISIT (OUTPATIENT)
Dept: SURGERY | Facility: CLINIC | Age: 64
End: 2024-09-03

## 2024-09-10 ENCOUNTER — OFFICE VISIT (OUTPATIENT)
Dept: SURGERY | Facility: CLINIC | Age: 64
End: 2024-09-10
Attending: PHYSICIAN ASSISTANT
Payer: COMMERCIAL

## 2024-09-10 VITALS
HEIGHT: 74 IN | OXYGEN SATURATION: 95 % | SYSTOLIC BLOOD PRESSURE: 161 MMHG | BODY MASS INDEX: 19.89 KG/M2 | WEIGHT: 155 LBS | DIASTOLIC BLOOD PRESSURE: 93 MMHG | HEART RATE: 110 BPM

## 2024-09-10 DIAGNOSIS — Z12.11 ENCOUNTER FOR SCREENING COLONOSCOPY: Primary | ICD-10-CM

## 2024-09-10 DIAGNOSIS — K61.0 PERIANAL ABSCESS: Primary | ICD-10-CM

## 2024-09-10 PROCEDURE — 99203 OFFICE O/P NEW LOW 30 MIN: CPT | Performed by: SURGERY

## 2024-09-10 ASSESSMENT — PAIN SCALES - GENERAL: PAINLEVEL: NO PAIN (1)

## 2024-09-10 NOTE — LETTER
"9/10/2024       RE: Gurwinder Martin  [address] [place] [address]     Dear Colleague,    Thank you for referring your patient, Gurwinder Martin, to the [facility] COLON AND RECTAL SURGERY CLINIC [place] [facility]. Please see a copy of my visit note below.    Colon and Rectal Surgery Clinic Note     RE:      Gurwinder Martin.  :   1960.  RADHA:   9/10/2024.     Reason for visit: perianal abscess.     HPI: Gurwinder Martin is a 64 year old male who presents today for a perianal abscess. He has a past medical history of HTN, bilateral adrenal incidentaloma, and varicose veins. He is s/p I and D of a perianal abscess on 2024. He was prescribed clindamycin but this was switched to Keflex.      Today Isacc feels fine. He is a current smoker. Never had a colonoscopy.        Medical history:  HTN  Bilateral adrenal incidentaloma  Varicose veins      Surgical history:  Past Surgical History   No past surgical history on file.        Family history:  Family History   No family history on file.       Medications:  Current Outpatient Prescriptions          Current Outpatient Medications   Medication Sig Dispense Refill     amLODIPine (NORVASC) 5 MG tablet Take 1 tablet (5 mg) by mouth daily for 31 days 30 tablet 0     amLODIPine (NORVASC) 5 MG tablet Take 1 tablet (5 mg) by mouth daily 90 tablet 1     cephALEXin (KEFLEX) 500 MG capsule Take 1 capsule (500 mg) by mouth 3 times daily for 5 days. 15 capsule 0     chlorhexidine (HIBICLENS) 4 % liquid Apply topically daily as needed for other (for cleansing) 118 mL 0     ibuprofen (ADVIL,MOTRIN) 600 MG tablet [IBUPROFEN (ADVIL,MOTRIN) 600 MG TABLET] Take 600 mg by mouth every 6 (six) hours as needed for pain.         nicotine polacrilex (NICORETTE) 4 MG gum How to take it: When the urge to smoke occurs, chew gum until you feel a tingle, then \"park\" the gum in your cheek until the tingle is gone. Re-chew every " "few minutes and \"park\" again, chewing one piece for 30 minutes. Do not eat or drink while chewing. Follow this schedule: Weeks 1 to 6: One piece of gum every 1 to 2 hours. Use at least 9 pieces a day, but no more than 24. Weeks 7 to 9: One piece of gum every 2 to 4 hours. Weeks 10 to 12: One piece of gum every 4 to 8 hours. 100 each 5            Allergies:  Allergies        Allergies   Allergen Reactions     Penicillins Unknown            Social history:   Social History            Tobacco Use     Smoking status: Every Day       Types: Cigarettes       Passive exposure: Current     Smokeless tobacco: Not on file   Substance Use Topics     Alcohol use: Not on file      Marital status: single.     ROS:  A complete review of systems was performed with the patient and all systems negative except as per HPI.     Physical Examination:  Exam was chaperoned by Otf Gates, EMT-P  BP (!) 161/93 (BP Location: Left arm, Patient Position: Sitting, Cuff Size: Adult Regular)   Pulse 110   Ht 1.88 m (6' 2\")   Wt 70.3 kg (155 lb)   SpO2 95%   BMI 19.90 kg/m     General: Well hydrated. No acute distress.  CV: RRR  Lung: Non-labored breathing on RA  Abdomen: Soft, NT.  Perianal external examination:  Multiple ingrown hairs, well healed incision. No evidence of fistula.  Digital rectal examination: Was deferred.          ASSESSMENT     This is a 64 year old M with a perianal abscess s/p I and D on 8/26/2024. He is recovering well. We discussed the possible risks of fistula-in-ano; he was instructed on the associated symptoms and instructed to contact our clinic if those were to develop.      All pertinent labs and imaging were personally reviewed by me.        PLAN  - Colonoscopy when possible  - Continue sitz baths  - Start a daily fiber supplement such as Citrucel or Metamucil. Start with once a day and slowly increase up to three times a day, if needed, over the next 4-6 weeks  - RTC as needed        30 minutes spent on the " date of the encounter doing chart review, history and exam, imaging review, documentation and further activities as noted above.       Servando Staton MD    Division of Colon and Rectal Surgery  [facility]     Referring Provider:  Shannan David PA-C  [address] [place] [address]      Primary Care Provider:  Karson Moura            Again, thank you for allowing me to participate in the care of your patient.      Sincerely,    Servando Staton MD

## 2024-09-10 NOTE — NURSING NOTE
"Chief Complaint   Patient presents with    Consult       Vitals:    09/10/24 1308   BP: (!) 161/93   BP Location: Left arm   Patient Position: Sitting   Cuff Size: Adult Regular   Pulse: 110   SpO2: 95%   Weight: 155 lb   Height: 6' 2\"       Body mass index is 19.9 kg/m .    Otf Gates EMT-P    "

## 2024-11-07 DIAGNOSIS — I10 HYPERTENSION, UNSPECIFIED TYPE: ICD-10-CM

## 2024-11-08 RX ORDER — AMLODIPINE BESYLATE 5 MG/1
5 TABLET ORAL DAILY
Qty: 90 TABLET | Refills: 0 | Status: SHIPPED | OUTPATIENT
Start: 2024-11-08

## 2025-02-05 DIAGNOSIS — I10 HYPERTENSION, UNSPECIFIED TYPE: ICD-10-CM

## 2025-02-05 RX ORDER — AMLODIPINE BESYLATE 5 MG/1
5 TABLET ORAL DAILY
Qty: 90 TABLET | Refills: 0 | Status: SHIPPED | OUTPATIENT
Start: 2025-02-05

## 2025-02-05 NOTE — TELEPHONE ENCOUNTER
02/05/2025    Pt returned call. TC relayed message to Pt and Pt understood message. TC offered to schedule a preventative exam for PT but he declined. Pt states he will schedule adult preventative appointment on [facility].    Nothing else needed at this time.    Clementina Greer

## 2025-05-14 DIAGNOSIS — I10 HYPERTENSION, UNSPECIFIED TYPE: ICD-10-CM

## 2025-05-14 RX ORDER — AMLODIPINE BESYLATE 5 MG/1
5 TABLET ORAL DAILY
Qty: 90 TABLET | Refills: 0 | Status: SHIPPED | OUTPATIENT
Start: 2025-05-14

## 2025-08-09 ENCOUNTER — HEALTH MAINTENANCE LETTER (OUTPATIENT)
Age: 65
End: 2025-08-09